# Patient Record
Sex: FEMALE | Race: WHITE | Employment: FULL TIME | URBAN - METROPOLITAN AREA
[De-identification: names, ages, dates, MRNs, and addresses within clinical notes are randomized per-mention and may not be internally consistent; named-entity substitution may affect disease eponyms.]

---

## 2017-03-16 ENCOUNTER — OFFICE VISIT (OUTPATIENT)
Dept: FAMILY MEDICINE CLINIC | Age: 42
End: 2017-03-16

## 2017-03-16 ENCOUNTER — TELEPHONE (OUTPATIENT)
Dept: FAMILY MEDICINE CLINIC | Age: 42
End: 2017-03-16

## 2017-03-16 VITALS
HEIGHT: 72 IN | RESPIRATION RATE: 13 BRPM | HEART RATE: 52 BPM | SYSTOLIC BLOOD PRESSURE: 118 MMHG | BODY MASS INDEX: 28.17 KG/M2 | WEIGHT: 208 LBS | OXYGEN SATURATION: 99 % | DIASTOLIC BLOOD PRESSURE: 70 MMHG

## 2017-03-16 DIAGNOSIS — Z13.9 SCREENING: ICD-10-CM

## 2017-03-16 PROCEDURE — 99212 OFFICE O/P EST SF 10 MIN: CPT | Performed by: CLINICAL NURSE SPECIALIST

## 2017-03-16 ASSESSMENT — ENCOUNTER SYMPTOMS
NAUSEA: 0
SWOLLEN GLANDS: 0
COUGH: 0
VOMITING: 0

## 2017-03-19 ASSESSMENT — ENCOUNTER SYMPTOMS
SHORTNESS OF BREATH: 0
SORE THROAT: 0
CHEST TIGHTNESS: 0
DIARRHEA: 0
CONSTIPATION: 0
ABDOMINAL PAIN: 0

## 2017-03-24 ENCOUNTER — HOSPITAL ENCOUNTER (OUTPATIENT)
Dept: MAMMOGRAPHY | Age: 42
Discharge: HOME | End: 2017-03-24

## 2017-03-29 DIAGNOSIS — R92.2 BREAST DENSITY: Primary | ICD-10-CM

## 2017-04-04 DIAGNOSIS — R92.8 ABNORMAL MAMMOGRAM: Primary | ICD-10-CM

## 2017-04-06 ENCOUNTER — HOSPITAL ENCOUNTER (OUTPATIENT)
Dept: ULTRASOUND IMAGING | Age: 42
Discharge: HOME | End: 2017-04-06
Attending: FAMILY MEDICINE | Admitting: FAMILY MEDICINE

## 2017-04-06 DIAGNOSIS — R92.2 BREAST DENSITY: ICD-10-CM

## 2017-04-06 DIAGNOSIS — R92.8 OTHER ABNORMAL AND INCONCLUSIVE FINDINGS ON DIAGNOSTIC IMAGING OF BREAST: ICD-10-CM

## 2017-04-06 DIAGNOSIS — R92.8 ABNORMAL MAMMOGRAM, UNSPECIFIED: ICD-10-CM

## 2017-07-21 ENCOUNTER — OFFICE VISIT (OUTPATIENT)
Dept: FAMILY MEDICINE CLINIC | Age: 42
End: 2017-07-21

## 2017-07-21 VITALS
OXYGEN SATURATION: 99 % | HEART RATE: 50 BPM | SYSTOLIC BLOOD PRESSURE: 114 MMHG | WEIGHT: 209.6 LBS | BODY MASS INDEX: 28.43 KG/M2 | DIASTOLIC BLOOD PRESSURE: 78 MMHG | TEMPERATURE: 98.3 F

## 2017-07-21 DIAGNOSIS — M25.561 CHRONIC PAIN OF RIGHT KNEE: Primary | ICD-10-CM

## 2017-07-21 DIAGNOSIS — E66.09 OBESITY DUE TO EXCESS CALORIES, UNSPECIFIED OBESITY SEVERITY: ICD-10-CM

## 2017-07-21 DIAGNOSIS — G89.29 CHRONIC PAIN OF RIGHT KNEE: Primary | ICD-10-CM

## 2017-07-21 PROCEDURE — 99213 OFFICE O/P EST LOW 20 MIN: CPT | Performed by: FAMILY MEDICINE

## 2017-07-21 RX ORDER — SOTALOL HYDROCHLORIDE 80 MG/1
TABLET ORAL
Refills: 5 | COMMUNITY
Start: 2017-07-13 | End: 2018-07-18 | Stop reason: ALTCHOICE

## 2017-07-23 ASSESSMENT — ENCOUNTER SYMPTOMS
CHEST TIGHTNESS: 0
COUGH: 0
ABDOMINAL PAIN: 0
SHORTNESS OF BREATH: 0

## 2017-12-15 ENCOUNTER — OFFICE VISIT (OUTPATIENT)
Dept: FAMILY MEDICINE CLINIC | Age: 42
End: 2017-12-15

## 2017-12-15 VITALS
BODY MASS INDEX: 27.48 KG/M2 | OXYGEN SATURATION: 98 % | TEMPERATURE: 98 F | WEIGHT: 202.6 LBS | DIASTOLIC BLOOD PRESSURE: 74 MMHG | HEART RATE: 61 BPM | SYSTOLIC BLOOD PRESSURE: 120 MMHG

## 2017-12-15 DIAGNOSIS — Z12.4 PAP SMEAR FOR CERVICAL CANCER SCREENING: ICD-10-CM

## 2017-12-15 DIAGNOSIS — Z01.419 ENCOUNTER FOR GYNECOLOGICAL EXAMINATION WITHOUT ABNORMAL FINDING: Primary | ICD-10-CM

## 2017-12-15 PROBLEM — I49.3 PVC (PREMATURE VENTRICULAR CONTRACTION): Status: ACTIVE | Noted: 2017-02-10

## 2017-12-15 PROBLEM — I47.20 VENTRICULAR TACHYCARDIA (HCC): Status: ACTIVE | Noted: 2017-03-29

## 2017-12-15 LAB
BILIRUBIN, POC: NORMAL
BLOOD URINE, POC: NORMAL
CLARITY, POC: CLEAR
COLOR, POC: YELLOW
GLUCOSE URINE, POC: NORMAL
KETONES, POC: NORMAL
LEUKOCYTE EST, POC: NORMAL
NITRITE, POC: NORMAL
PH, POC: 6.5
PROTEIN, POC: NORMAL
SPECIFIC GRAVITY, POC: 1.01
UROBILINOGEN, POC: 1

## 2017-12-15 PROCEDURE — 81002 URINALYSIS NONAUTO W/O SCOPE: CPT | Performed by: FAMILY MEDICINE

## 2017-12-15 PROCEDURE — 99396 PREV VISIT EST AGE 40-64: CPT | Performed by: FAMILY MEDICINE

## 2017-12-15 RX ORDER — AZITHROMYCIN 250 MG/1
TABLET, FILM COATED ORAL
COMMUNITY
Start: 2017-12-13 | End: 2019-04-10 | Stop reason: ALTCHOICE

## 2017-12-15 RX ORDER — NEOMYCIN SULFATE, POLYMYXIN B SULFATE AND DEXAMETHASONE 3.5; 10000; 1 MG/ML; [USP'U]/ML; MG/ML
SUSPENSION/ DROPS OPHTHALMIC
Refills: 0 | COMMUNITY
Start: 2017-11-18 | End: 2018-04-12

## 2017-12-15 ASSESSMENT — PATIENT HEALTH QUESTIONNAIRE - PHQ9
1. LITTLE INTEREST OR PLEASURE IN DOING THINGS: 0
SUM OF ALL RESPONSES TO PHQ9 QUESTIONS 1 & 2: 0
2. FEELING DOWN, DEPRESSED OR HOPELESS: 0
SUM OF ALL RESPONSES TO PHQ QUESTIONS 1-9: 0

## 2017-12-15 NOTE — PROGRESS NOTES
Subjective:       Pepe Irby is a 38 y.o. female here for routine exam.  Current Complaints: no.  Personal Health Questionnaire Reviewed: no.     Gynecologic History  Patient's last menstrual period was 2017. Contraception: vasectomynormal  Last Pap: no Results: normal  Last Mammogram: 2017 Results:     Patient's medications, allergies, past medical, surgical, social and family histories were reviewed and updated as appropriate. Review of Systems  Pertinent items are noted in HPI.       Objective:      /74 (Site: Left Arm, Position: Sitting, Cuff Size: Large Adult)   Pulse 61   Temp 98 °F (36.7 °C) (Oral)   Wt 202 lb 9.6 oz (91.9 kg)   LMP 2017   SpO2 98%   BMI 27.48 kg/m²   Neck: no adenopathy, no carotid bruit, no JVD, supple, symmetrical, trachea midline and thyroid not enlarged, symmetric, no tenderness/mass/nodules  Lungs: clear to auscultation bilaterally  Breasts: normal appearance, no masses or tenderness  Heart: regular rate and rhythm  Abdomen: soft, non-tender; bowel sounds normal; no masses,  no organomegaly  Pelvic: cervix normal in appearance, external genitalia normal, no adnexal masses or tenderness, no cervical motion tenderness, rectovaginal septum normal, uterus normal size, shape, and consistency, vagina normal without discharge  Lymph nodes: Cervical, supraclavicular, and axillary nodes normal.  Neurologic: Grossly normal        Stool-heme neg  Assessment:      Healthy female exam.      Plan:    Education Reviewed and Recommended: Smoking Cessation, Self Breast Exams, Weight Bearing Exercise, Low Fat, Low Cholesterol Diet, Depression Evaluation  Follow up: 1 year

## 2017-12-19 LAB
HPV COMMENT: ABNORMAL
HPV TYPE 16: NOT DETECTED
HPV TYPE 18: NOT DETECTED
HPVOH (OTHER TYPES): DETECTED

## 2018-03-26 ENCOUNTER — HOSPITAL ENCOUNTER (OUTPATIENT)
Dept: MAMMOGRAPHY | Age: 43
Discharge: HOME | End: 2018-03-26
Attending: FAMILY MEDICINE | Admitting: FAMILY MEDICINE

## 2018-03-26 DIAGNOSIS — Z12.31 ENCOUNTER FOR SCREENING MAMMOGRAM FOR BREAST CANCER: ICD-10-CM

## 2018-03-27 ENCOUNTER — TELEPHONE (OUTPATIENT)
Dept: FAMILY MEDICINE CLINIC | Age: 43
End: 2018-03-27

## 2018-03-27 NOTE — TELEPHONE ENCOUNTER
I 'm not sure why she was here for labs or what labs she needs?  I did not ask her to come in for labs

## 2018-04-12 ENCOUNTER — OFFICE VISIT (OUTPATIENT)
Dept: CARDIOLOGY CLINIC | Age: 43
End: 2018-04-12

## 2018-04-12 VITALS
BODY MASS INDEX: 28.31 KG/M2 | DIASTOLIC BLOOD PRESSURE: 76 MMHG | SYSTOLIC BLOOD PRESSURE: 120 MMHG | HEIGHT: 72 IN | HEART RATE: 46 BPM | WEIGHT: 209 LBS | OXYGEN SATURATION: 96 %

## 2018-04-12 DIAGNOSIS — I42.0 DILATED CARDIOMYOPATHY (HCC): ICD-10-CM

## 2018-04-12 DIAGNOSIS — R94.30 EJECTION FRACTION < 50%: ICD-10-CM

## 2018-04-12 DIAGNOSIS — I47.20 VENTRICULAR TACHYCARDIA: ICD-10-CM

## 2018-04-12 DIAGNOSIS — I49.3 PVC (PREMATURE VENTRICULAR CONTRACTION): Primary | ICD-10-CM

## 2018-04-12 PROCEDURE — 93000 ELECTROCARDIOGRAM COMPLETE: CPT | Performed by: INTERNAL MEDICINE

## 2018-04-12 PROCEDURE — 99204 OFFICE O/P NEW MOD 45 MIN: CPT | Performed by: INTERNAL MEDICINE

## 2018-04-12 RX ORDER — CARVEDILOL 3.12 MG/1
3.12 TABLET ORAL 2 TIMES DAILY WITH MEALS
Qty: 180 TABLET | Refills: 3 | Status: SHIPPED | OUTPATIENT
Start: 2018-04-12 | End: 2019-04-09 | Stop reason: SDUPTHER

## 2018-04-17 ENCOUNTER — NURSE ONLY (OUTPATIENT)
Dept: CARDIOLOGY CLINIC | Age: 43
End: 2018-04-17

## 2018-04-17 DIAGNOSIS — I49.9 IRREGULAR HEART RATE: Primary | ICD-10-CM

## 2018-04-25 PROCEDURE — 93224 XTRNL ECG REC UP TO 48 HRS: CPT | Performed by: INTERNAL MEDICINE

## 2018-04-27 ENCOUNTER — TELEPHONE (OUTPATIENT)
Dept: CARDIOLOGY CLINIC | Age: 43
End: 2018-04-27

## 2018-04-27 DIAGNOSIS — I47.20 VENTRICULAR TACHYCARDIA: ICD-10-CM

## 2018-07-18 ENCOUNTER — HOSPITAL ENCOUNTER (OUTPATIENT)
Dept: NON INVASIVE DIAGNOSTICS | Age: 43
Discharge: HOME | End: 2018-07-18
Attending: INTERNAL MEDICINE | Admitting: INTERNAL MEDICINE

## 2018-07-18 ENCOUNTER — OFFICE VISIT (OUTPATIENT)
Dept: CARDIOLOGY CLINIC | Age: 43
End: 2018-07-18

## 2018-07-18 VITALS
HEIGHT: 72 IN | OXYGEN SATURATION: 98 % | WEIGHT: 206 LBS | HEART RATE: 69 BPM | BODY MASS INDEX: 27.9 KG/M2 | DIASTOLIC BLOOD PRESSURE: 68 MMHG | SYSTOLIC BLOOD PRESSURE: 132 MMHG

## 2018-07-18 DIAGNOSIS — I42.0 DILATED CARDIOMYOPATHY (HCC): ICD-10-CM

## 2018-07-18 DIAGNOSIS — Z92.89 HISTORY OF CARDIAC MONITORING: ICD-10-CM

## 2018-07-18 DIAGNOSIS — I49.3 PVC (PREMATURE VENTRICULAR CONTRACTION): Primary | ICD-10-CM

## 2018-07-18 DIAGNOSIS — R09.89 OTHER SPECIFIED SYMPTOMS AND SIGNS INVOLVING THE CIRCULATORY AND RESPIRATORY SYSTEMS: ICD-10-CM

## 2018-07-18 LAB
LEFT VENTRICULAR EJECTION FRACTION HIGH VALUE: 55 %
LEFT VENTRICULAR EJECTION FRACTION MODE: NORMAL
LV EF: 55 %
LVEF MODALITY: NORMAL

## 2018-07-18 PROCEDURE — 93000 ELECTROCARDIOGRAM COMPLETE: CPT | Performed by: INTERNAL MEDICINE

## 2018-07-18 PROCEDURE — 99213 OFFICE O/P EST LOW 20 MIN: CPT | Performed by: INTERNAL MEDICINE

## 2018-07-18 RX ORDER — ENALAPRIL MALEATE 2.5 MG/1
TABLET ORAL
Refills: 5 | COMMUNITY
Start: 2018-06-27 | End: 2019-04-10 | Stop reason: SDUPTHER

## 2018-07-18 NOTE — PATIENT INSTRUCTIONS
converting enzyme. Lisinopril is used to treat high blood pressure (hypertension) in adults and children who are at least 10years old. Lisinopril is also used to treat congestive heart failure in adults, or to improve survival after a heart attack. Lisinopril may also be used for purposes not listed in this medication guide. What should I discuss with my healthcare provider before taking lisinopril? You should not use lisinopril if you are allergic to it, or if:  · you have a history of angioedema;  · you recently took a heart medicine called sacubatril; or  · if you are allergic to any other ACE inhibitor, such as benazepril, captopril, enalapril, fosinopril, moexipril, perindopril, quinapril, ramipril, or trandolapril. Do not take lisinopril within 36 hours before or after taking medicine that contains sacubatril (such as Entresto). If you have diabetes, do not use lisinopril together with any medication that contains aliskiren (such as Tekturna or Tekamlo). You may also need to avoid taking lisinopril with aliskiren if you have kidney disease. You should not use lisinopril if you have hereditary angioedema. To make sure lisinopril is safe for you, tell your doctor if you have:  · kidney disease (or if you are on dialysis);  · liver disease; or  · high levels of potassium in your blood. Do not use if you are pregnant. If you become pregnant, stop taking this medicine and tell your doctor right away. Lisinopril can cause injury or death to the unborn baby if you take the medicine during your second or third trimester. It is not known whether lisinopril passes into breast milk or if it could harm a nursing baby. You should not breast-feed while using this medicine. How should I take lisinopril? Follow all directions on your prescription label. Your doctor may occasionally change your dose. Do not take this medicine in larger or smaller amounts or for longer than recommended.   Drink plenty of water each day while you are taking this medicine. Lisinopril can be taken with or without food. Measure liquid medicine with the dosing syringe provided, or with a special dose-measuring spoon or medicine cup. If you do not have a dose-measuring device, ask your pharmacist for one. Your blood pressure will need to be checked often, and you may need frequent blood tests. Call your doctor if you have ongoing vomiting or diarrhea, or if you are sweating more than usual. You can easily become dehydrated while taking this medicine. This can lead to very low blood pressure, electrolyte disorders, or kidney failure while you are taking lisinopril. If you need surgery, tell the surgeon ahead of time that you are using lisinopril. If you are being treated for high blood pressure, keep using this medicine even if you feel well. High blood pressure often has no symptoms. You may need to use blood pressure medicine for the rest of your life. Store at room temperature away from moisture and heat. Do not freeze the oral liquid. What happens if I miss a dose? Take the missed dose as soon as you remember. Skip the missed dose if it is almost time for your next scheduled dose. Do not take extra medicine to make up the missed dose. What happens if I overdose? Seek emergency medical attention or call the Poison Help line at 1-406.506.8285. What should I avoid while taking lisinopril? Drinking alcohol can further lower your blood pressure and may increase certain side effects of lisinopril. Avoid becoming overheated or dehydrated during exercise, in hot weather, or by not drinking enough fluids. Lisinopril can decrease sweating and you may be more prone to heat stroke. Do not use salt substitutes or potassium supplements while taking lisinopril, unless your doctor has told you to. Avoid getting up too fast from a sitting or lying position, or you may feel dizzy. Get up slowly and steady yourself to prevent a fall.   What are the never share your medicines with others, and use this medication only for the indication prescribed. Every effort has been made to ensure that the information provided by [address] is accurate, up-to-date, and complete, but no guarantee is made to that effect. Drug information contained herein may be time sensitive. [facility] information has been compiled for use by healthcare practitioners and consumers in the [place] and therefore [facility] does not warrant that uses outside of the [place] are appropriate, unless specifically indicated otherwise. [facility]'s drug information does not endorse drugs, diagnose patients or recommend therapy. [facility]'s drug information is an informational resource designed to assist licensed healthcare practitioners in caring for their patients and/or to serve consumers viewing this service as a supplement to, and not a substitute for, the expertise, skill, knowledge and judgment of healthcare practitioners. The absence of a warning for a given drug or drug combination in no way should be construed to indicate that the drug or drug combination is safe, effective or appropriate for any given patient. [facility] does not assume any responsibility for any aspect of healthcare administered with the aid of information [facility] provides. The information contained herein is not intended to cover all possible uses, directions, precautions, warnings, drug interactions, allergic reactions, or adverse effects. If you have questions about the drugs you are taking, check with your doctor, nurse or pharmacist.  Copyright 8463-2421 [address]. Version: 14.02. Revision date: 10/12/2017. Care instructions adapted under license by [facility] ([facility]). If you have questions about a medical condition or this instruction, always ask your healthcare professional. [place] [address] any warranty or liability for your use of this information.

## 2019-03-28 ENCOUNTER — HOSPITAL ENCOUNTER (OUTPATIENT)
Dept: MAMMOGRAPHY | Age: 44
Discharge: HOME | End: 2019-03-28
Payer: COMMERCIAL

## 2019-03-28 DIAGNOSIS — Z12.39 BREAST CANCER SCREENING, HIGH RISK PATIENT: ICD-10-CM

## 2019-03-28 PROCEDURE — 77067 SCR MAMMO BI INCL CAD: CPT

## 2019-04-09 RX ORDER — CARVEDILOL 3.12 MG/1
TABLET ORAL
Qty: 180 TABLET | Refills: 3 | Status: SHIPPED | OUTPATIENT
Start: 2019-04-09 | End: 2020-05-04

## 2019-04-10 ENCOUNTER — OFFICE VISIT (OUTPATIENT)
Dept: CARDIOLOGY CLINIC | Age: 44
End: 2019-04-10
Payer: COMMERCIAL

## 2019-04-10 ENCOUNTER — HOSPITAL ENCOUNTER (OUTPATIENT)
Dept: NON INVASIVE DIAGNOSTICS | Age: 44
Discharge: HOME | End: 2019-04-10
Payer: COMMERCIAL

## 2019-04-10 VITALS
SYSTOLIC BLOOD PRESSURE: 124 MMHG | BODY MASS INDEX: 28.17 KG/M2 | WEIGHT: 208 LBS | HEIGHT: 72 IN | DIASTOLIC BLOOD PRESSURE: 70 MMHG

## 2019-04-10 DIAGNOSIS — I49.3 PVC (PREMATURE VENTRICULAR CONTRACTION): ICD-10-CM

## 2019-04-10 DIAGNOSIS — I42.0 DILATED CARDIOMYOPATHY (HCC): ICD-10-CM

## 2019-04-10 DIAGNOSIS — I47.20 VENTRICULAR TACHYCARDIA: ICD-10-CM

## 2019-04-10 DIAGNOSIS — I49.3 PVC (PREMATURE VENTRICULAR CONTRACTION): Primary | ICD-10-CM

## 2019-04-10 LAB
LEFT VENTRICULAR EJECTION FRACTION HIGH VALUE: 60 %
LEFT VENTRICULAR EJECTION FRACTION MODE: NORMAL
LV EF: 55 %
LV EF: 58 %
LVEF MODALITY: NORMAL

## 2019-04-10 PROCEDURE — 99213 OFFICE O/P EST LOW 20 MIN: CPT | Performed by: INTERNAL MEDICINE

## 2019-04-10 PROCEDURE — 93000 ELECTROCARDIOGRAM COMPLETE: CPT | Performed by: INTERNAL MEDICINE

## 2019-04-10 PROCEDURE — 93306 TTE W/DOPPLER COMPLETE: CPT

## 2019-04-10 RX ORDER — ENALAPRIL MALEATE 2.5 MG/1
TABLET ORAL
Qty: 90 TABLET | Refills: 3 | Status: SHIPPED | OUTPATIENT
Start: 2019-04-10 | End: 2020-04-07

## 2019-04-10 NOTE — PROGRESS NOTES
[facility]   Electrophysiology Follow up   Date: 4/10/2019      Chief Complaint   Patient presents with    Palpitations     7 month f/u. Echo today. No cardiac complaints.  Cardiomyopathy        HPI: Luis A Garcia is a 41 y.o. female sent as referral from Dr. Kiera Eldridge for PVCs. No pertinent past medical history other than PVCs. Symptomatic with palpitations, on sotalol, previous PVC/VT  ablation 3/30/2017 and Vtach new onset day before ablation. holter 2018 completed with echo indicated an increase in PVCs when compared to previous holter 2016. EF was noted to be 45% 2/2018. The last Holter 2016 showed PVC burden of 26K in 48 hours. Repeat holter in 4/2018 with burden at 20.1%. Patient denies lightheadedness, SOB, dizziness, chest pain,  orthopnea, edema, presyncope or syncope. Repeat Echo 7/18/2018 with EF 55%. Echo repeat today Ef looks unchanged  Kervin Asher is feeling well from a cardiac standpoint. Patient denies lightheadedness, dizziness, chest pain, palpitations, orthopnea, edema, presyncope or syncope. Past Medical History:   Diagnosis Date    Spontaneous vaginal delivery     [id number]    PVC    Past Surgical History:   Procedure Laterality Date    FRACTURE SURGERY      leg    KNEE SURGERY         Allergies: Allergies   Allergen Reactions    Morphine Anxiety       Social History:   reports that she has never smoked. She has never used smokeless tobacco. She reports that she does not drink alcohol or use drugs. Family History:  family history includes Allergies in her son; Asthma in her brother; Cancer in her father. Reviewed. Denies family history of sudden cardiac death, arrhythmia, premature CAD    Review of System:  All other systems reviewed and are negative except for that noted above.  Pertinent negatives are:   General: negative for fever, chills   Ophthalmic ROS: negative for - eye pain or loss of vision  ENT ROS: negative for - headaches, sore throat Respiratory: negative for - cough, sputum  Cardiovascular: Reviewed in HPI  Gastrointestinal: negative for - abdominal pain, diarrhea, N/V  Hematology: negative for - bleeding, blood clots, bruising or jaundice  Genito-Urinary:  negative for - Dysuria or incontinence  Musculoskeletal: negative for - Joint swelling, muscle pain  Neurological: negative for - confusion, dizziness, headaches   Psychiatric: No anxiety, no depression. Dermatological: negative for - rash    Physical Examination:  Vitals:    04/10/19 1451   BP: 124/70      Wt Readings from Last 3 Encounters:   04/10/19 208 lb (94.3 kg)   07/18/18 206 lb (93.4 kg)   04/12/18 209 lb (94.8 kg)       · Constitutional: Oriented. No distress. · Head: Normocephalic and atraumatic. · Mouth/Throat: Oropharynx is clear and moist.   · Eyes: Conjunctivae normal. EOM are normal.   · Neck: Neck supple. No rigidity. No JVD present. · Cardiovascular: Normal rate, regular rhythm, S1&S2. · Pulmonary/Chest: Bilateral respiratory sounds. No wheezes, No rhonchi. · Abdominal: Soft. Bowel sounds present. No distension, No tenderness. · Musculoskeletal: No tenderness. No edema    · Lymphadenopathy: Has no cervical adenopathy. · Neurological: Alert and oriented. Cranial nerve appears intact, No Gross deficit   · Skin: Skin is warm and dry. No rash noted. · Psychiatric: Has a normal behavior       Labs, diagnostic and imaging results reviewed. Reviewed. Lab Results   Component Value Date    TSH 1.20 06/27/2016    CREATININE 0.6 06/27/2016    AST 15 06/27/2016    ALT 12 06/27/2016     ECG 4/10/2019:   Sinus with PVC's       Echo 4/10/19  My review shows normal EF    Holter 48 hour 4/17/2018  SR. HR  (70). Frequent PVCs with burden at 20.1%. No symptoms reported    Echo 7/18/2018:  Normal left ventricle size and systolic function with an EF of 55%. Mild tricuspid regurgitation with RVSP of 35 mmHg.   Trivial mitral regurgitation    Echocardiogram and   occassional trigeminy. EPS 3/2017  Subsequently, we advanced a quadripolar catheter into the right ventricular Septum. The patient had frequent PVCs. The PVC were narrow and had LBBB morphology. Transition in V3. Lead II, III and avF were positive, lead 1 and avL were negative, indicating origin from outflow region. Cathter D-F Smart touch surround flow mapping showed earliest activation in the posterior RVOT. It was 20ms before onset of PVC. Pace mapping from this area showed 99% pace match. However, very early point couldn't be identified. CARTO 3 D mapping of the RV septum and RVOT was performed. Arterial access 8Fr was obtained  The catheter was advanced into the left ventricle outflow tract after crossing the aortic valve. Subsequently, we used the CARTO-3system and created a three-dimensional electroanatomical map of the left ventricle and left side of interventricular septum in region of outflow tract was performed We mapped the earliest electrogram, however, none of the areas were before RVOT by activation. At this point, we decided to ablate in posterior RVOT. 82-34 Watt lesion application induced triggering of VT with exact PVC morphology. Multiple lesions were performed with suppression of arrhythmia but after wait PVC with slightly different morphology appeared. This PVC was mapped again but the earliest point wasn't in the same area, we remapped both left and right out flow tract. However, no earliest point couldn't be identified. It is possible that the focus is intra-myocardial in posterior RVOT septum. Additional lesions in this RVOT didn't suppress PVC    Next, under fluoroscopy, we advanced a decapolar catheter into the coronary sinus, so the distal poles were in coronary sinus for left atrial recording, mapping and pacing. We tried to go to anterior interventricular vein but due to venous anatomy, it couldn't be advanced.       Medication:  Current Outpatient Medications Medication Sig Dispense Refill    carvedilol (COREG) 3.125 MG tablet TAKE 1 TABLET BY MOUTH TWICE DAILY WITH MEALS 180 tablet 3    enalapril (VASOTEC) 2.5 MG tablet TK 1 T PO DAILY  5     No current facility-administered medications for this visit. Assessment:  PVCs   s/p ablation 3/2017 - Holter showed PVC burden 14%, increase from previous holter despite being on sotalol and recently started on vasotec 2.5mg (3/20/2018). Sotalol was stopped and changed to coreg      Seems like, sotalol was not effective, discontinued 4/12/2018, holter repeated 4/17/2018 with burden at 20.1% No PVC on ECG today. EF has improved to 55%    -Discussed ablation in future if EF drops    Risks, benefits and alternative of each treatment options were explained. All questions answered. Continue with coreg 3.125mg BID and see if burden is changing and if EF improves with echo in 6 months. Plan is to repeat echo and consider ablation if EF starts to deteriorate    Yearly echo    Holter Monitor Every few years    cardiomyopathy:  EF on echo 7/2018 at 55%   EF normal this time  On lisiniopril        Plan:  Echo in one year with f/u        I independently reviewed  holter   Thank you for allowing me to participate in the care of Niels Ledesma. Further evaluation will be based upon the patient's clinical course and testing results. All questions and concerns were addressed to the patient/family. Alternatives to my treatment were discussed. I have discussed the above stated plan and the patient verbalized understanding and agreed with the plan. NOTE: This report was transcribed using voice recognition software. Every effort was made to ensure accuracy, however, inadvertent computerized transcription errors may be present. Nicole Reed MD, Micah Shaper [address] [facility]   Office: [phone]     Scribe attestation:  This note was scribed in the presence of Nicole Reed MD by Alayna Patel RN    I,

## 2020-01-14 ENCOUNTER — TELEPHONE (OUTPATIENT)
Dept: CARDIOLOGY CLINIC | Age: 45
End: 2020-01-14

## 2020-01-14 NOTE — TELEPHONE ENCOUNTER
Has appt with echo with mxa in April .  The order for the echo will  before she has the test so we need a new order put in epic

## 2020-05-04 RX ORDER — CARVEDILOL 3.12 MG/1
TABLET ORAL
Qty: 180 TABLET | Refills: 1 | Status: SHIPPED | OUTPATIENT
Start: 2020-05-04 | End: 2020-11-20

## 2020-07-02 ENCOUNTER — HOSPITAL ENCOUNTER (OUTPATIENT)
Dept: MAMMOGRAPHY | Age: 45
Discharge: HOME | End: 2020-07-02
Payer: COMMERCIAL

## 2020-07-02 PROCEDURE — 77067 SCR MAMMO BI INCL CAD: CPT

## 2020-07-06 RX ORDER — ENALAPRIL MALEATE 2.5 MG/1
TABLET ORAL
Qty: 90 TABLET | Refills: 0 | Status: SHIPPED | OUTPATIENT
Start: 2020-07-06 | End: 2020-09-30

## 2020-07-06 NOTE — TELEPHONE ENCOUNTER
Requested Prescriptions     Pending Prescriptions Disp Refills    enalapril (VASOTEC) 2.5 MG tablet [Pharmacy Med Name: ENALAPRIL 2.5MG TABLETS] 90 tablet 0     Sig: TAKE 1 TABLET BY MOUTH DAILY          Number: 90    Refills: 3    Last Office Visit: 4/10/2019     Next Office Visit: 7/15/2020

## 2020-07-08 ENCOUNTER — HOSPITAL ENCOUNTER (OUTPATIENT)
Dept: WOMENS IMAGING | Age: 45
Discharge: HOME | End: 2020-07-08
Payer: COMMERCIAL

## 2020-07-08 ENCOUNTER — HOSPITAL ENCOUNTER (OUTPATIENT)
Dept: ULTRASOUND IMAGING | Age: 45
Discharge: HOME | End: 2020-07-08
Payer: COMMERCIAL

## 2020-07-08 PROCEDURE — 76642 ULTRASOUND BREAST LIMITED: CPT

## 2020-07-08 PROCEDURE — G0279 TOMOSYNTHESIS, MAMMO: HCPCS

## 2020-07-13 ENCOUNTER — HOSPITAL ENCOUNTER (OUTPATIENT)
Dept: NON INVASIVE DIAGNOSTICS | Age: 45
Discharge: HOME | End: 2020-07-13
Payer: COMMERCIAL

## 2020-07-13 LAB
LV EF: 55 %
LVEF MODALITY: NORMAL

## 2020-07-13 PROCEDURE — 93306 TTE W/DOPPLER COMPLETE: CPT

## 2020-07-15 ENCOUNTER — VIRTUAL VISIT (OUTPATIENT)
Dept: CARDIOLOGY CLINIC | Age: 45
End: 2020-07-15
Payer: COMMERCIAL

## 2020-07-15 PROCEDURE — 99213 OFFICE O/P EST LOW 20 MIN: CPT | Performed by: INTERNAL MEDICINE

## 2020-07-15 NOTE — PROGRESS NOTES
[address]   Electrophysiology  Virtual Visit  Date: 7/15/2020      Chief Complaint   Patient presents with    Follow-up     PVC    Tachycardia        HPI: Ann Gayle is a 45 y.o. female with a PMH of PVCs. No pertinent past medical history other than PVCs. Symptomatic with palpitations, on sotalol, previous PVC/VT  ablation 3/30/2017 and Vtach new onset day before ablation. holter 2018 completed with echo indicated an increase in PVCs when compared to previous holter 2016. EF was noted to be 45% 2/2018. The last Holter 2016 showed PVC burden of 26K in 48 hours. Repeat holter in 4/2018 with burden at 20.1%. Repeat Echo 7/18/2018 with EF 55%. Echo 7/13/2020 with EF 55%    Johnny Torres presents to the office via virtual visit. She is doing well from a cardiac standpoint. Patient denies lightheadedness, dizziness, chest pain, palpitations, orthopnea, edema, presyncope or syncope. Denies any PVC recurrence        Past Medical History:   Diagnosis Date    Spontaneous vaginal delivery     [id number]    PVC    Past Surgical History:   Procedure Laterality Date    FRACTURE SURGERY      leg    KNEE SURGERY         Allergies: Allergies   Allergen Reactions    Morphine Anxiety       Social History:   reports that she has never smoked. She has never used smokeless tobacco. She reports that she does not drink alcohol or use drugs. Family History:  family history includes Allergies in her son; Asthma in her brother; Cancer in her father. Reviewed. Denies family history of sudden cardiac death, arrhythmia, premature CAD    Review of System:  All other systems reviewed and are negative except for that noted above.  Pertinent negatives are:   General: negative for fever, chills   Ophthalmic ROS: negative for - eye pain or loss of vision  ENT ROS: negative for - headaches, sore throat   Respiratory: negative for - cough, sputum  Cardiovascular: Reviewed in HPI  Gastrointestinal: negative for - diffuse    hypokinesis. Left ventricular diastolic function parameters were    normal. Ejection fraction (MOD, 2-plane): 45%. - Left atrium: The atrium was mildly dilated. - Right ventricle: Systolic function was normal by objective    interpretation. Impressions:  In comparison to the previous report the LV function  is slightly reduced. Echo 3/2017  - Left ventricle: The cavity size was normal. Wall thickness was    normal. Systolic function was normal. The estimated ejection    fraction was in the range of 50% to 55%. Wall motion was normal;    there were no regional wall motion abnormalities. Left ventricular    diastolic function parameters were normal.  - Right ventricle: The cavity size was normal. Wall thickness was    normal. Systolic function was normal by objective interpretation.    TAPSE: 2.6cm. Tricuspid annular systolic velocity: 64RE/U. - Atrial septum: No defect or patent foramen ovale was identified by    color Doppler examination. Holter Monitor 2/27/18: Indications:  PVCs  Date and Time:   From: 02/22/2018  At: 8:48   To: 02/24/2018  At: 8:48    Total Hours:  48 hr, 0 min  Quality of Tracing:  good   Basic Rhythm:  sinus  Minimum Heart Rate:  41  Maximum Heart Rate:  110 Average Heart   Rate:  66  PVC's:  46791 Average PVC's/Hour:  557/hour  ARRHYTHMIAS:  Ventricular Tachycardia:  none  SVT:  none  ST and T Wave Changes:    PAC's:  1  Conduction Defects:    Symptoms:  Patient reported no symptoms during monitoring period. CONCLUSION: Normal sinus rhythm with frequent bigeminy and   occassional trigeminy. EPS 3/2017  Subsequently, we advanced a quadripolar catheter into the right ventricular Septum. The patient had frequent PVCs. The PVC were narrow and had LBBB morphology. Transition in V3. Lead II, III and avF were positive, lead 1 and avL were negative, indicating origin from outflow region.  Cathter D-F Smart touch surround flow mapping showed earliest activation in the posterior RVOT. It was 20ms before onset of PVC. Pace mapping from this area showed 99% pace match. However, very early point couldn't be identified. CARTO 3 D mapping of the RV septum and RVOT was performed. Arterial access 8Fr was obtained  The catheter was advanced into the left ventricle outflow tract after crossing the aortic valve. Subsequently, we used the CARTO-3system and created a three-dimensional electroanatomical map of the left ventricle and left side of interventricular septum in region of outflow tract was performed We mapped the earliest electrogram, however, none of the areas were before RVOT by activation. At this point, we decided to ablate in posterior RVOT. 82-98 Watt lesion application induced triggering of VT with exact PVC morphology. Multiple lesions were performed with suppression of arrhythmia but after wait PVC with slightly different morphology appeared. This PVC was mapped again but the earliest point wasn't in the same area, we remapped both left and right out flow tract. However, no earliest point couldn't be identified. It is possible that the focus is intra-myocardial in posterior RVOT septum. Additional lesions in this RVOT didn't suppress PVC    Next, under fluoroscopy, we advanced a decapolar catheter into the coronary sinus, so the distal poles were in coronary sinus for left atrial recording, mapping and pacing. We tried to go to anterior interventricular vein but due to venous anatomy, it couldn't be advanced. Medication:  Current Outpatient Medications   Medication Sig Dispense Refill    enalapril (VASOTEC) 2.5 MG tablet TAKE 1 TABLET BY MOUTH DAILY 90 tablet 0    carvedilol (COREG) 3.125 MG tablet TAKE 1 TABLET BY MOUTH TWICE DAILY WITH MEALS 180 tablet 1     No current facility-administered medications for this visit.         Assessment:  PVCs   -s/p ablation 3/2017 - Holter showed PVC burden 14%, increase from previous holter despite being on sotalol and recently started on vasotec 2.5mg (3/20/2018). Sotalol was stopped and changed to coreg  -Seems like, sotalol was not effective, discontinued 4/12/2018, holter repeated 4/17/2018 with burden at 20.1% No PVC on ECG today. EF has improved to 55%  -Discussed ablation in future if EF drops   -Risks, benefits and alternative of each treatment options were explained. All questions answered.    -Continue with coreg 3.125mg BID and see if burden is changing and if EF improves with echo in 6 months.   -Echo 7/13/2020 EF 55%    She feels fine    Cardiomyopathy:  -EF on echo 7/2020 55%  -EF on echo 7/2018 55%   -On lisiniopril        Plan:  1 year follow up  Continue current ttreatment      I independently reviewed  holter   Thank you for allowing me to participate in the care of Monica Solares. Further evaluation will be based upon the patient's clinical course and testing results. All questions and concerns were addressed to the patient/family. Alternatives to my treatment were discussed. I have discussed the above stated plan and the patient verbalized understanding and agreed with the plan. NOTE: This report was transcribed using voice recognition software. Every effort was made to ensure accuracy, however, inadvertent computerized transcription errors may be present. Adarsh Kinney MD, [facility]   Office: [phone]     Scribe attestation: This note was scribed in the presence of Adarsh Kinney MD by Dominic Lomas RN    I, Dr. Adarsh Kinney personally performed the services described in this documentation as scribed by RN in my presence, and it is both accurate and complete.        Monica Solares is a 45 y.o. female being evaluated by a Virtual Visit (video visit) encounter to address concerns as mentioned above. A caregiver was present when appropriate.  Due to this being a TeleHealth encounter ([facility]), evaluation of the following organ

## 2020-07-20 ENCOUNTER — HOSPITAL ENCOUNTER (OUTPATIENT)
Dept: ULTRASOUND IMAGING | Age: 45
Discharge: HOME | End: 2020-07-20
Payer: COMMERCIAL

## 2020-07-20 ENCOUNTER — HOSPITAL ENCOUNTER (OUTPATIENT)
Dept: MAMMOGRAPHY | Age: 45
Discharge: HOME | End: 2020-07-20
Payer: COMMERCIAL

## 2020-07-20 PROCEDURE — 88305 TISSUE EXAM BY PATHOLOGIST: CPT

## 2020-07-20 PROCEDURE — 2709999900 US BREAST BIOPSY W LOC DEVICE 1ST LESION RIGHT

## 2020-07-20 PROCEDURE — 77065 DX MAMMO INCL CAD UNI: CPT

## 2020-07-27 NOTE — PROGRESS NOTES
Patient Risk: 15.3%  Average Population Risk: 10.6%       Breast History:  History of Previous Breast Biopsy:yes-papillary lesion  Self Breast Exams Completed:yes  Family History of Breast Cancer:no  Family History of Other Cancers:yes     Lymphoma-father-alive  Lumphoma-brother-alive    Ashkenazi [employer]:no    Gyne History:  Age of Menarche: 17 years  : 3  Para: 3  Age of Menopause:    Age of first live Birth: 31 years  History of Hysterectomy / ALVIN-BSO: No  History of OCP's/HRT: No     When and how long:  NA  Family History or personal history of Ovarian Cancer: no

## 2020-07-28 NOTE — PROGRESS NOTES
CC:Right breast mass    HPI:  Peter Goldman is a 45 y.o. woman who presents with new right breast mass, noted on screening mammogram.  U/s guided biopsy concerning for minimally sampled papillary lesion, 16mm in maximum dimension. She reports no breast complaints . She denies masses, skin changes, or nipple discharge. She does not have a family history of breast or ovarian cancer. I reviewed breast imaging with Dr. Kanika Vázquez today. Based on Tyrer-Cuzik 8.0 risk model, her lifetime risk of breast cancer is 15.3%. She has PVCs, mild cardiomyopathy, all stable, recent visit with cardiology. No chest pain, SOB, leg swelling. She is active, although she does not exercise. Non smoker, works from home, accompanied by her . Menstrual History  Menarche age 17. [id number]. Age first live birth 31  premenopausal   Ovaries/uterus intact  Oral contraceptives No   Hormone replacement No     Imaging and path 7/2/20-7/20/20   FINAL DIAGNOSIS:   Right breast, core biopsy of 16 x 7 x 4 mm retroareolar nodule at 9:00,   X-shaped clip:      - Fibrocystic changes, primarily variably prominent stromal fibrosis        (up to 8-9 contiguous mm on slide). - Negative for malignancy and significant atypia. - See Comment. COMMENT:   A microscopic fragment of detached epithelium (intimately   associated with one of the cores) raises the possibility of a minimally   sampled papillary lesion; clinical correlation is required, with lesion   excision as appropriate/desirable. ULTRASOUND-GUIDED RIGHT BREAST NEEDLE CORE BIOPSY        FINDINGS: Following discussion of the potential risks, benefits, and alternatives, informed consent was obtained. Right breast was prepped and draped in usual sterile fashion and 1% lidocaine used for local esthesia.         Under ultrasound guidance four 14-gauge core samples were obtained through the solid mass in the retroareolar right breast using a spring loaded biopsy device. Samples were collected and sent for appropriate pathologic analysis. An X shaped tissue    markers placed at the biopsy site. Patient tolerated the procedure well with no immediate postprocedural complications. Impression        1. Technically successful ultrasound guided right breast mass needle core biopsy with clip placement. Pathology results are pending. POSTPROCEDURE MAMMOGRAM.        CC and true lateral views of the right breast were obtained. Right breast is heterogeneously dense fibroglandular tissue. Tissue marker overlies the periareolar right breast site of ultrasound-guided core biopsy. Clip appears in good position. IMPRESSION:        1. Adequate placement of tissue marker at site of ultrasound-guided core biopsy. US BREAST LIMITED LEFT ON JULY 8, 2020        HISTORY: Abnormal bilateral mammogram        COMPARISON: Bilateral mammogram July 2, 2020        LIMITATIONS: None. FINDINGS:        Tomographic compression images were obtained in the region of nodular density on the right. Persistent nodule is noted behind the nipple. Ultrasound examination right breast retroareolar area demonstrates a solid nodule which measures 14 x 10 x 15 mm    indeterminate. Ultrasound-guided biopsy recommended. Ultrasound examination of the right axilla demonstrates normal fatty replaced axillary nodes. Tomographic compression images obtained of the left breast posteriorly in the upper aspect demonstrates no persistent nodule. Ultrasound of the left breast at the 2:00 position 6 cm from the nipple demonstrates incidental cyst measuring 7 x 16 x 4 mm. Findings discussed with the ultrasound technologist who will discuss the findings with the patient as well as the nursing service who will call the referring physician and discuss the findings with the patient and arrange for biopsy.         This study was performed and interpreted using Full Field Complete 07/29/2020   Allergen Reaction Noted    Morphine Anxiety 04/14/2010     Social History     Tobacco Use    Smoking status: Never Smoker    Smokeless tobacco: Never Used   Substance Use Topics    Alcohol use: No    Drug use: No     Review of Systems    Current Outpatient Medications on File Prior to Visit   Medication Sig Dispense Refill    enalapril (VASOTEC) 2.5 MG tablet TAKE 1 TABLET BY MOUTH DAILY 90 tablet 0    carvedilol (COREG) 3.125 MG tablet TAKE 1 TABLET BY MOUTH TWICE DAILY WITH MEALS 180 tablet 1     No current facility-administered medications on file prior to visit. Exam:  /84 (Site: Left Upper Arm, Position: Sitting, Cuff Size: Medium Adult)   Pulse 82   Temp 97.8 °F (36.6 °C) (Tympanic)   Ht 6' (1.829 m)   Wt 225 lb (102.1 kg)   BMI 30.52 kg/m²   Physical Exam  Constitutional: She appears well-nourished. No apparent distress. Head: Normocephalic and atraumatic. Eyes: EOM are normal. Pupils are equal, round, and reactive to light. Neck: Neck supple. No tracheal deviation present. Cardiovascular: Regular rate and rhythm. Pulmonary: Respirations are non-labored no wheezing. Lymphatics: No palpable cervical, supraclavicular, or axillary lymphadenopathy. Breast:   Right: No masses, nipple discharge, bruising over UOQ, adjacent to edge of areola. Left: No masses, nipple discharge, or overlying skin changes. There is no axillary lymphadenopathy palpated bilaterally. Skin: No rash noted. Multiple nevi, none concerning, sees derm annually, s/p multiple excisions of nevi  Extremities: Well perfused, no edema. Neurologic: Alert and oriented. Assessment and Plan:    Right breast mass, 1.6cm, concern for papilloma. I recommend surgical excisional biopsy, to fully assess lesion and for better risk assessment. We reviewed the concept of concordance.  We discussed the potential outcomes of an excisional breast biopsy which would include benign pathology as well as a atypia or malignancy. We discussed that based on final pathology she may require additional surgery or other treatments. Final pathology may also alter her follow up and screening recommendations. I recommend an excisional breast biopsy in the operating room. We discussed the technique of RFID seed localization. We would then proceed to the operating room to remove the abnormal area within the breast. We discussed the risks and benefits of surgery which include but are not limited to bleeding, infection, wound complications, unappealing cosmetics, and the need to return to the operating room for a second procedure based on final pathology. We reviewed expectations for recovery. She has stable cardiomyopathy, no symptoms, recent cardiology visit. She will need covid testing, but no additional preop workup. She and her  are happy with the discussion and plan.

## 2020-07-29 ENCOUNTER — OFFICE VISIT (OUTPATIENT)
Dept: SURGERY | Age: 45
End: 2020-07-29
Payer: COMMERCIAL

## 2020-07-29 VITALS
HEART RATE: 82 BPM | HEIGHT: 72 IN | BODY MASS INDEX: 30.48 KG/M2 | TEMPERATURE: 97.8 F | SYSTOLIC BLOOD PRESSURE: 132 MMHG | DIASTOLIC BLOOD PRESSURE: 84 MMHG | WEIGHT: 225 LBS

## 2020-07-29 PROCEDURE — 99204 OFFICE O/P NEW MOD 45 MIN: CPT | Performed by: SURGERY

## 2020-07-30 ENCOUNTER — TELEPHONE (OUTPATIENT)
Dept: SURGERY | Age: 45
End: 2020-07-30

## 2020-07-30 NOTE — TELEPHONE ENCOUNTER
Spoke with patient, date of 8/10 did not work for patient. Surgery is scheduled for 8/18 at 12:00, arrival time of 10:00. Patient is to have Covid testing done 8/12 or 8/13. Patient is to have pre-op physical done prior to surgery and also will need to obtain cardiac clearance as her clearance will be out of the 30 day period. Patient verbalized understanding.

## 2020-08-05 ENCOUNTER — TELEPHONE (OUTPATIENT)
Dept: CARDIOLOGY CLINIC | Age: 45
End: 2020-08-05

## 2020-08-05 NOTE — LETTER
[facility] CARDIOLOGY[address]  Dept: [phone]  Dept Fax: [phone]      2020    Eduard Bullard  : 1975  DOS: 2020    To Whom it May Concern:    Sushil Peterson has been evaluated for cardiac clearance. Based on diagnostic testing, Sushil Peterson is considered at a low risk for surgery. There is no further cardiac testing that could be done to lower the risk. Please let my office know if you have any questions or concerns.       Roslyn Gosselin, MD                           [facility] serving [place] and [place]

## 2020-08-05 NOTE — TELEPHONE ENCOUNTER
CARDIAC CLEARANCE     What type of procedure are you having? Breast sx  Which physician is performing your procedure? ? [facility]    When is your procedure scheduled for? 8/18/20  [facility]  Are you taking Blood Thinners? no   If so what? (Name/dose/frequesncy)     Does the surgeon want you to stop your blood thinner? If so for how long?     Phone Number and Contact Name for Physicians office: [phone]    Fax number to send information:

## 2020-08-06 ENCOUNTER — OFFICE VISIT (OUTPATIENT)
Dept: FAMILY MEDICINE CLINIC | Age: 45
End: 2020-08-06
Payer: COMMERCIAL

## 2020-08-06 VITALS
HEART RATE: 83 BPM | BODY MASS INDEX: 31 KG/M2 | TEMPERATURE: 97.9 F | SYSTOLIC BLOOD PRESSURE: 114 MMHG | OXYGEN SATURATION: 98 % | WEIGHT: 228.6 LBS | DIASTOLIC BLOOD PRESSURE: 90 MMHG

## 2020-08-06 PROCEDURE — 99202 OFFICE O/P NEW SF 15 MIN: CPT | Performed by: FAMILY MEDICINE

## 2020-08-06 ASSESSMENT — PATIENT HEALTH QUESTIONNAIRE - PHQ9
1. LITTLE INTEREST OR PLEASURE IN DOING THINGS: 0
SUM OF ALL RESPONSES TO PHQ QUESTIONS 1-9: 0
SUM OF ALL RESPONSES TO PHQ QUESTIONS 1-9: 0
SUM OF ALL RESPONSES TO PHQ9 QUESTIONS 1 & 2: 0
2. FEELING DOWN, DEPRESSED OR HOPELESS: 0

## 2020-08-06 NOTE — PROGRESS NOTES
-Preoperative Consultation-      Patient name: Suman Noe    YOB: 1975    Date of Service: 8/6/2020    Chief Complaint   Patient presents with    Pre-op Exam     Removing a lump from right breast. Surgery on 8/18. This patient presents to the office today for a preoperative consultation    Surgery type: right breast radiofrequency tag excisional breast biopsy  Surgeon: Dr. Sacha Mai  Date of operation: August 18, 2020   Location: [facility].      Conservative therapy includes: N/A. Planned anesthesia: Local and Topical anesthesia   Known anesthesia problems: None   Bleeding risk: No recent or remote history of abnormal bleeding  Personal or FH of DVT/PE: No    Patient objection to receiving blood products: No    Patient Active Problem List   Diagnosis    Status post induction of labor    Spontaneous vaginal delivery    Strain    Benign neoplasm of skin    PVC (premature ventricular contraction)    Ventricular tachycardia (HCC)       Past Surgical History:   Procedure Laterality Date    FRACTURE SURGERY      leg    KNEE SURGERY      US BREAST NEEDLE BIOPSY RIGHT  7/20/2020    US BREAST NEEDLE BIOPSY RIGHT 7/20/2020 [facility] ULTRASOUND     Family History   Problem Relation Age of Onset    Cancer Father     Asthma Brother     Allergies Son      Allergies   Allergen Reactions    Morphine Anxiety     Current Outpatient Medications   Medication Sig Dispense Refill    enalapril (VASOTEC) 2.5 MG tablet TAKE 1 TABLET BY MOUTH DAILY 90 tablet 0    carvedilol (COREG) 3.125 MG tablet TAKE 1 TABLET BY MOUTH TWICE DAILY WITH MEALS 180 tablet 1     No current facility-administered medications for this visit.        Social History     Socioeconomic History    Marital status:      Spouse name: Not on file    Number of children: Not on file    Years of education: Not on file    Highest education level: Not on file   Occupational History    Not on file   Social Needs  Financial resource strain: Not on file   [place] insecurity     Worry: Not on file     Inability: Not on file   [employer] needs     Medical: Not on file     Non-medical: Not on file   Tobacco Use    Smoking status: Never Smoker    Smokeless tobacco: Never Used   Substance and Sexual Activity    Alcohol use: No    Drug use: No    Sexual activity: Yes     Partners: Male   Lifestyle    Physical activity     Days per week: Not on file     Minutes per session: Not on file    Stress: Not on file   Relationships    Social connections     Talks on phone: Not on file     Gets together: Not on file     Attends Buddhism service: Not on file     Active member of club or organization: Not on file     Attends meetings of clubs or organizations: Not on file     Relationship status: Not on file    Intimate partner violence     Fear of current or ex partner: Not on file     Emotionally abused: Not on file     Physically abused: Not on file     Forced sexual activity: Not on file   Other Topics Concern    Not on file   Social History Narrative    Not on file     Review of Systems:  A comprehensive review of systems was negative except for what was noted in the HPI. Physical Exam:   Vitals:    08/06/20 0732   BP: (!) 114/90   Pulse: 83   Temp: 97.9 °F (36.6 °C)   SpO2: 98%   Weight: 228 lb 9.6 oz (103.7 kg)      Wt Readings from Last 2 Encounters:   08/06/20 228 lb 9.6 oz (103.7 kg)   07/29/20 225 lb (102.1 kg)     BP Readings from Last 3 Encounters:   08/06/20 (!) 114/90   07/29/20 132/84   04/10/19 124/70      Constitutional: She is oriented to person, place, and time. She appears well-developed and well-nourished. No distress. HENT:   Head: Normocephalic and atraumatic. Mouth/Throat: Uvula is midline, oropharynx is clear and moist and mucous membranes are normal.   Eyes: Conjunctivae and EOM are normal. Pupils are equal, round, and reactive to light. Neck: Trachea normal and normal range of motion. -Emergent procedure: No  -Active Cardiac Condition: No (decompensated HF, Arrhythmia, MI <3 weeks, severe valve disease)  -Risk Level of Procedure: Low Risk (endoscopy, superficial skin, breast, ambulatory, or cataract, etc.)  -Revised Cardiac Risk Index Risk factors: None  -Exercise Tolerance before Surgery: good        HAS-BLED Score                            Risk Factors      Points   Hypertension  Uncontrolled, >701 mmHg systolic   No=0   Renal disease  Dialysis, transplant, Cr>2.26 mg/dL or >200 µmol/L   No=0   Liver disease   Cirrhosis or bilirubin >2x normal with AST/ALT/AP >3x normal   No=0   Stroke history   No=0   Prior history of major bleeding or predisposition to bleeding     No=0   Labile INR  Unstable/high INRs, time in therapeutic range <60%   No=0   Age >71   No=0   Medication usage predisposing to bleeding   Aspirin, Clopidogrel, Prasugrel, Ticagrelor, NSAIDs, warfarin, DOACs   No=0   Alcohol use   >7 drinks/week   No=0     HAS-BLED score:    0:  Risk was 0.9% in one validation study and 1.13 bleeds per 100 patient-years in another validation study. ICD-10-CM    1. Preop examination  Z01.818    2. Breast lump on right side at 9 o'clock position  N63.10    3. Ventricular tachycardia (HCC)  I47.2    4. Essential hypertension  I10       Plan:   1. Preoperative workup as follows: none, COVID-19 testing (about 3-5 days prior to procedure - depending on surgical site recommendation)     2. Change in medication regiment before surgery: none  -On anti-platelet drug(s): None  -On anticoagulant drug: None  -On SGLT-2 inhibitor drug: None    3.  Prophylaxis for cardiac events with perioperative beta-blockers: none; Currently taking  carvedilol  ACC/AHA indications for pre-operative beta-blocker use:    · Vascular surgery with history of positive stress test  · Intermediate or high risk surgery with history of CAD   · Intermediate or high risk surgery with multiple clinical predictors of CAD- 2 of the following: history of compensated or prior heart failure, history of cerebrovascular disease, DM, or renal insufficiency  Routine administration of higher-dose, long-acting metoprolol in beta-blocker-naïve patients on the day of surgery, and in the absence of dose titration is associated with an overall increase in mortality. Beta-blockers should be started days to weeks prior to surgery and titrated to pulse < 70.    4. Deep vein thrombosis prophylaxis: not indicated; counseled patient on ambulating as much as possible and minimizing sedentary time as well as discussed about moving lower extremities around while lying down to increase circulation    5. Antibiotic prophylaxis: none    6. Smoking cessation and education provided (if applicable)    7. Further recommendations from consultants: None    8. Scanned pre-op form to media section and faxed to number listed on form (if applicable)    Pre-op assessment: No contraindications to planned surgery    Other: I discussed with patient about avoiding NSAIDS at least 7 days prior to procedure and afterwards. Counseled patient to only use Tylenol for pain and if pain persists to schedule an appointment with me. Bertin Sanchez 177    Electronically signed by Narinder Storm MD on 8/6/2020 at 8:16 AM.

## 2020-08-07 NOTE — PROGRESS NOTES
Patient was scheduled for Covid testing on 8-11 @[facility]. I left her a message that she needed to come in on 8-12 instead and that [address] wasn't open on Wednesdays. I also informed her that [place] was open 8-3 and that she didn't need an appointment.

## 2020-08-13 ENCOUNTER — OFFICE VISIT (OUTPATIENT)
Dept: PRIMARY CARE CLINIC | Age: 45
End: 2020-08-13

## 2020-08-13 NOTE — PATIENT INSTRUCTIONS

## 2020-08-14 ENCOUNTER — TELEPHONE (OUTPATIENT)
Dept: SURGERY | Age: 45
End: 2020-08-14

## 2020-08-14 NOTE — TELEPHONE ENCOUNTER
Left VM for patient that surgery times for Tuesday 8/18 has been changed. Patient's surgery is now at 21 [phone] with an arrival time of 0830.   Asked patient to return call to office to confirm

## 2020-08-15 LAB
SARS-COV-2: NOT DETECTED
SOURCE: NORMAL

## 2020-08-17 ENCOUNTER — HOSPITAL ENCOUNTER (OUTPATIENT)
Dept: ULTRASOUND IMAGING | Age: 45
Discharge: HOME | End: 2020-08-17
Payer: COMMERCIAL

## 2020-08-17 ENCOUNTER — ANESTHESIA EVENT (OUTPATIENT)
Dept: OPERATING ROOM | Age: 45
End: 2020-08-17
Payer: COMMERCIAL

## 2020-08-17 ENCOUNTER — HOSPITAL ENCOUNTER (OUTPATIENT)
Dept: MAMMOGRAPHY | Age: 45
Discharge: HOME | End: 2020-08-17
Payer: COMMERCIAL

## 2020-08-17 PROCEDURE — 77065 DX MAMMO INCL CAD UNI: CPT

## 2020-08-17 PROCEDURE — 2709999900 US PLACE BREAST LOC DEVICE 1ST LESION RIGHT

## 2020-08-17 NOTE — PROGRESS NOTES
[facility] PRE-SURGICAL TESTING INSTRUCTIONS                              PRIOR TO PROCEDURE DATE:  1. Please follow any guidelines/instructions prior to your procedure as advised by your surgeon. 2. Arrange for someone to drive you home and be with you for the first 24 hours after discharge for your safety after your procedure for which you received sedation. Ensure it is someone we can share information with regarding your discharge. 3. You must contact your surgeon for instructions IF:   You are taking any blood thinners, aspirin, anti-inflammatory or vitamin E.   There is a change in your physical condition such as a cold, fever, rash, cuts, sores or any other infection, especially near your surgical site. 4. Do not drink alcohol the day before or day of your procedure. 5. A Pre-op History and Physical for surgery MUST be completed by your Physician or Urgent Care within 30 days of your procedure date. Please bring a copy with you on the day of your procedure and along with any other testing performed. THE DAY OF YOUR PROCEDURE:  1. Follow instructions for ARRIVAL TIME as DIRECTED BY YOUR SURGEON. I    2. Enter the MAIN entrance from [place] and follow the signs to the free [employer] or [employer] parking (offered free of charge 6am-5pm). 3. Enter the Main Entrance of the hospital (do not enter from the lower level of the parking garage). Upon entrance, check in with the  at the main desk on your left. If no one is available at the desk, proceed into the [facility] Waiting Room and go through the door directly into the [facility]. There is a Check-in desk ACROSS from Room 5 (marked with a sign hanging from the ceiling). The phone number for the surgery center is [phone]. 4. Please call [phone] option #2 option #2 if you have not been preregistered yet. On the day of your procedure bring your insurance card and photo ID.  You will be registered at your bedside once brought back to your room. 5. DO NOT EAT ANYTHING eight hours prior to surgery. May have 8 ounces of water 4 hours prior to surgery. 6. MEDICATIONS    Take the following medications with a SMALL sip of water: Coreg   Use your usual dose of inhalers the morning of surgery. BRING your rescue inhaler with you to hospital.    Anesthesia does NOT want you to take insulin the morning of surgery. They will control your blood sugar while you are at the hospital. Please contact your ordering physician for instructions regarding your insulin the night before your procedure. If you have an insulin pump, please keep it set on basal rate. 7. Do not swallow water when brushing teeth. No gum, candy, mints or ice chips. Refrain from smoking or at least decrease the amount. 8. Dress in loose, comfortable clothing appropriate for redressing after your procedure. Do not wear jewelry (including body piercings), make-up (especially NO eye make-up), fingernail polish (NO toenail polish if foot/leg surgery), lotion, powders or metal hairclips. 9. Dentures, glasses, or contacts will need to be removed before your procedure. Bring cases for your glasses, contacts, dentures, or hearing aids to protect them while you are in surgery. 10. If you use a CPAP, please bring it with you on the day of your procedure. 11. We recommend that valuable personal  belongings such as cash, cell phones, e-tablets or jewelry, be left at home during your stay. The hospital will not be responsible for valuables that are not secured in the hospital safe. However, if your insurance requires a co-pay, you may want to bring a method of payment, i.e. Check or credit card, if you wish to pay your co-pay the day of surgery. 12. If you are to stay overnight, you may bring a bag with personal items.  Please have any large items you may need brought in by your family after your arrival to your hospital room.    13. If you have a Living Will or Durable Power of , please bring a copy on the day of your procedure. 15. With your permission, one family member may accompany you while you are being prepared for surgery. Once you are ready, additional family members may join you. HOW WE KEEP YOU SAFE and WORK TO PREVENT SURGICAL SITE INFECTIONS:  1. Health care workers should always check your ID bracelet to verify your name and birth date. You will be asked many times to state your name, date of birth, and allergies. 2. Health care workers should always clean their hands with soap or alcohol gel before providing care to you. It is okay to ask anyone if they cleaned their hands before they touch you. 3. You will be actively involved in verifying the type of procedure you are having and ensuring the correct surgical site. This will be confirmed multiple times prior to your procedure. Do NOT katty your surgery site UNLESS instructed to by your surgeon. 4. Do not shave or wax for 72 hours prior to procedure near your operative site. Shaving with a razor can irritate your skin and make it easier to develop an infection. On the day of your procedure, any hair that needs to be removed near the surgical site will be clipped by a healthcare worker using a special clippers designed to avoid skin irritation. 5. When you are in the operating room, your surgical site will be cleansed with a special soap, and in most cases, you will be given an antibiotic before the surgery begins. What to expect AFTER YOUR PROCEDURE:  1. Immediately following your procedure, your will be taken to the PACU for the first phase of your recovery. Your nurse will help you recover from any potential side effects of anesthesia, such as extreme drowsiness, changes in your vital signs or breathing patterns. Nausea, headache, muscle aches, or sore throat may also occur after anesthesia.   Your nurse will help you manage these potential side effects. 2. For comfort and safety, arrange to have someone at home with you for the first 24 hours after discharge. 3. You and your family will be given written instructions about your diet, activity, dressing care, medications, and return visits. 4. Once at home, should issues with nausea, pain, or bleeding occur, or should you notice any signs of infection, you should call your surgeon. 5. Always clean your hands before and after caring for your wound. Do not let your family touch your surgery site without cleaning their hands. 6. Narcotic pain medications can cause significant constipation. You may want to add a stool softener to your postoperative medication schedule or speak to your surgeon on how best to manage this SIDE EFFECT. SPECIAL INSTRUCTIONS    Thank you for allowing us to care for you. We strive to exceed your expectations in the delivery of care and service provided to you and your family. If you need to contact us for any reason, please call us at [phone]    Instructions reviewed with patient during preadmission testing phone interview. Ever Paintign. 8/17/2020 .10:10 AM      ADDITIONAL EDUCATIONAL INFORMATION REVIEWED PER PHONE WITH YOU AND/OR YOUR FAMILY:  Yes Antibacterial Soap

## 2020-08-18 ENCOUNTER — HOSPITAL ENCOUNTER (OUTPATIENT)
Dept: MAMMOGRAPHY | Age: 45
Discharge: HOME | End: 2020-08-18
Payer: COMMERCIAL

## 2020-08-18 ENCOUNTER — HOSPITAL ENCOUNTER (OUTPATIENT)
Age: 45
Setting detail: OUTPATIENT SURGERY
Discharge: HOME | End: 2020-08-18
Attending: SURGERY | Admitting: SURGERY
Payer: COMMERCIAL

## 2020-08-18 ENCOUNTER — ANESTHESIA (OUTPATIENT)
Dept: OPERATING ROOM | Age: 45
End: 2020-08-18
Payer: COMMERCIAL

## 2020-08-18 VITALS
DIASTOLIC BLOOD PRESSURE: 74 MMHG | TEMPERATURE: 97.3 F | OXYGEN SATURATION: 98 % | RESPIRATION RATE: 16 BRPM | SYSTOLIC BLOOD PRESSURE: 108 MMHG

## 2020-08-18 VITALS
SYSTOLIC BLOOD PRESSURE: 117 MMHG | RESPIRATION RATE: 18 BRPM | HEIGHT: 72 IN | BODY MASS INDEX: 30.88 KG/M2 | WEIGHT: 228 LBS | OXYGEN SATURATION: 96 % | HEART RATE: 73 BPM | DIASTOLIC BLOOD PRESSURE: 79 MMHG | TEMPERATURE: 98.4 F

## 2020-08-18 LAB — PREGNANCY, URINE: NEGATIVE

## 2020-08-18 PROCEDURE — 2580000003 HC RX 258: Performed by: SURGERY

## 2020-08-18 PROCEDURE — 3700000001 HC ADD 15 MINUTES (ANESTHESIA): Performed by: SURGERY

## 2020-08-18 PROCEDURE — 2720000010 HC SURG SUPPLY STERILE: Performed by: SURGERY

## 2020-08-18 PROCEDURE — 19125 EXCISION BREAST LESION: CPT | Performed by: SURGERY

## 2020-08-18 PROCEDURE — 76098 X-RAY EXAM SURGICAL SPECIMEN: CPT

## 2020-08-18 PROCEDURE — 2580000003 HC RX 258: Performed by: ANESTHESIOLOGY

## 2020-08-18 PROCEDURE — 7100000000 HC PACU RECOVERY - FIRST 15 MIN: Performed by: SURGERY

## 2020-08-18 PROCEDURE — 14001 TIS TRNFR TRUNK 10.1-30SQCM: CPT | Performed by: SURGERY

## 2020-08-18 PROCEDURE — 88307 TISSUE EXAM BY PATHOLOGIST: CPT

## 2020-08-18 PROCEDURE — 84703 CHORIONIC GONADOTROPIN ASSAY: CPT

## 2020-08-18 PROCEDURE — 7100000010 HC PHASE II RECOVERY - FIRST 15 MIN: Performed by: SURGERY

## 2020-08-18 PROCEDURE — 2500000003 HC RX 250 WO HCPCS: Performed by: NURSE ANESTHETIST, CERTIFIED REGISTERED

## 2020-08-18 PROCEDURE — 7100000011 HC PHASE II RECOVERY - ADDTL 15 MIN: Performed by: SURGERY

## 2020-08-18 PROCEDURE — 2709999900 HC NON-CHARGEABLE SUPPLY: Performed by: SURGERY

## 2020-08-18 PROCEDURE — 3700000000 HC ANESTHESIA ATTENDED CARE: Performed by: SURGERY

## 2020-08-18 PROCEDURE — 6360000002 HC RX W HCPCS: Performed by: SURGERY

## 2020-08-18 PROCEDURE — 6360000002 HC RX W HCPCS: Performed by: NURSE ANESTHETIST, CERTIFIED REGISTERED

## 2020-08-18 PROCEDURE — 3600000014 HC SURGERY LEVEL 4 ADDTL 15MIN: Performed by: SURGERY

## 2020-08-18 PROCEDURE — 2500000003 HC RX 250 WO HCPCS: Performed by: SURGERY

## 2020-08-18 PROCEDURE — 3600000004 HC SURGERY LEVEL 4 BASE: Performed by: SURGERY

## 2020-08-18 PROCEDURE — 7100000001 HC PACU RECOVERY - ADDTL 15 MIN: Performed by: SURGERY

## 2020-08-18 RX ORDER — SODIUM CHLORIDE 0.9 % (FLUSH) 0.9 %
10 SYRINGE (ML) INJECTION PRN
Status: DISCONTINUED | OUTPATIENT
Start: 2020-08-18 | End: 2020-08-18 | Stop reason: HOSPADM

## 2020-08-18 RX ORDER — LABETALOL 20 MG/4 ML (5 MG/ML) INTRAVENOUS SYRINGE
5 EVERY 10 MIN PRN
Status: DISCONTINUED | OUTPATIENT
Start: 2020-08-18 | End: 2020-08-18 | Stop reason: HOSPADM

## 2020-08-18 RX ORDER — ONDANSETRON 2 MG/ML
INJECTION INTRAMUSCULAR; INTRAVENOUS PRN
Status: DISCONTINUED | OUTPATIENT
Start: 2020-08-18 | End: 2020-08-18 | Stop reason: SDUPTHER

## 2020-08-18 RX ORDER — GLYCOPYRROLATE 0.2 MG/ML
INJECTION INTRAMUSCULAR; INTRAVENOUS PRN
Status: DISCONTINUED | OUTPATIENT
Start: 2020-08-18 | End: 2020-08-18 | Stop reason: SDUPTHER

## 2020-08-18 RX ORDER — DEXAMETHASONE SODIUM PHOSPHATE 4 MG/ML
INJECTION, SOLUTION INTRA-ARTICULAR; INTRALESIONAL; INTRAMUSCULAR; INTRAVENOUS; SOFT TISSUE PRN
Status: DISCONTINUED | OUTPATIENT
Start: 2020-08-18 | End: 2020-08-18 | Stop reason: SDUPTHER

## 2020-08-18 RX ORDER — FENTANYL CITRATE 50 UG/ML
25 INJECTION, SOLUTION INTRAMUSCULAR; INTRAVENOUS EVERY 5 MIN PRN
Status: DISCONTINUED | OUTPATIENT
Start: 2020-08-18 | End: 2020-08-18 | Stop reason: HOSPADM

## 2020-08-18 RX ORDER — KETAMINE HYDROCHLORIDE 50 MG/ML
INJECTION, SOLUTION, CONCENTRATE INTRAMUSCULAR; INTRAVENOUS PRN
Status: DISCONTINUED | OUTPATIENT
Start: 2020-08-18 | End: 2020-08-18 | Stop reason: SDUPTHER

## 2020-08-18 RX ORDER — SODIUM CHLORIDE 0.9 % (FLUSH) 0.9 %
10 SYRINGE (ML) INJECTION EVERY 12 HOURS SCHEDULED
Status: DISCONTINUED | OUTPATIENT
Start: 2020-08-18 | End: 2020-08-18 | Stop reason: HOSPADM

## 2020-08-18 RX ORDER — SODIUM CHLORIDE, SODIUM LACTATE, POTASSIUM CHLORIDE, CALCIUM CHLORIDE 600; 310; 30; 20 MG/100ML; MG/100ML; MG/100ML; MG/100ML
INJECTION, SOLUTION INTRAVENOUS CONTINUOUS
Status: DISCONTINUED | OUTPATIENT
Start: 2020-08-18 | End: 2020-08-18 | Stop reason: HOSPADM

## 2020-08-18 RX ORDER — ONDANSETRON 2 MG/ML
4 INJECTION INTRAMUSCULAR; INTRAVENOUS
Status: DISCONTINUED | OUTPATIENT
Start: 2020-08-18 | End: 2020-08-18 | Stop reason: HOSPADM

## 2020-08-18 RX ORDER — MAGNESIUM HYDROXIDE 1200 MG/15ML
LIQUID ORAL CONTINUOUS PRN
Status: COMPLETED | OUTPATIENT
Start: 2020-08-18 | End: 2020-08-18

## 2020-08-18 RX ORDER — HYDROCODONE BITARTRATE AND ACETAMINOPHEN 5; 325 MG/1; MG/1
1 TABLET ORAL EVERY 6 HOURS PRN
Qty: 5 TABLET | Refills: 0 | Status: SHIPPED | OUTPATIENT
Start: 2020-08-18 | End: 2020-08-21

## 2020-08-18 RX ORDER — FENTANYL CITRATE 50 UG/ML
50 INJECTION, SOLUTION INTRAMUSCULAR; INTRAVENOUS EVERY 5 MIN PRN
Status: DISCONTINUED | OUTPATIENT
Start: 2020-08-18 | End: 2020-08-18 | Stop reason: HOSPADM

## 2020-08-18 RX ORDER — PROPOFOL 10 MG/ML
INJECTION, EMULSION INTRAVENOUS PRN
Status: DISCONTINUED | OUTPATIENT
Start: 2020-08-18 | End: 2020-08-18 | Stop reason: SDUPTHER

## 2020-08-18 RX ORDER — HYDRALAZINE HYDROCHLORIDE 20 MG/ML
5 INJECTION INTRAMUSCULAR; INTRAVENOUS EVERY 5 MIN PRN
Status: DISCONTINUED | OUTPATIENT
Start: 2020-08-18 | End: 2020-08-18 | Stop reason: HOSPADM

## 2020-08-18 RX ORDER — BUPIVACAINE HYDROCHLORIDE 2.5 MG/ML
INJECTION, SOLUTION EPIDURAL; INFILTRATION; INTRACAUDAL PRN
Status: DISCONTINUED | OUTPATIENT
Start: 2020-08-18 | End: 2020-08-18 | Stop reason: ALTCHOICE

## 2020-08-18 RX ORDER — LIDOCAINE HYDROCHLORIDE 20 MG/ML
INJECTION, SOLUTION INFILTRATION; PERINEURAL PRN
Status: DISCONTINUED | OUTPATIENT
Start: 2020-08-18 | End: 2020-08-18 | Stop reason: SDUPTHER

## 2020-08-18 RX ORDER — HYDROCODONE BITARTRATE AND ACETAMINOPHEN 5; 325 MG/1; MG/1
1 TABLET ORAL EVERY 6 HOURS PRN
Qty: 42 TABLET | Refills: 0 | Status: SHIPPED | OUTPATIENT
Start: 2020-08-18 | End: 2020-08-18 | Stop reason: SDUPTHER

## 2020-08-18 RX ORDER — ENALAPRILAT 2.5 MG/2ML
1.25 INJECTION INTRAVENOUS
Status: DISCONTINUED | OUTPATIENT
Start: 2020-08-18 | End: 2020-08-18 | Stop reason: HOSPADM

## 2020-08-18 RX ORDER — FENTANYL CITRATE 50 UG/ML
INJECTION, SOLUTION INTRAMUSCULAR; INTRAVENOUS PRN
Status: DISCONTINUED | OUTPATIENT
Start: 2020-08-18 | End: 2020-08-18 | Stop reason: SDUPTHER

## 2020-08-18 RX ORDER — MIDAZOLAM HYDROCHLORIDE 1 MG/ML
INJECTION INTRAMUSCULAR; INTRAVENOUS PRN
Status: DISCONTINUED | OUTPATIENT
Start: 2020-08-18 | End: 2020-08-18 | Stop reason: SDUPTHER

## 2020-08-18 RX ADMIN — KETAMINE HYDROCHLORIDE 25 MG: 50 INJECTION, SOLUTION INTRAMUSCULAR; INTRAVENOUS at 09:56

## 2020-08-18 RX ADMIN — SODIUM CHLORIDE, SODIUM LACTATE, POTASSIUM CHLORIDE, AND CALCIUM CHLORIDE: 600; 310; 30; 20 INJECTION, SOLUTION INTRAVENOUS at 10:17

## 2020-08-18 RX ADMIN — LIDOCAINE HYDROCHLORIDE 100 MG: 20 INJECTION, SOLUTION INFILTRATION; PERINEURAL at 09:46

## 2020-08-18 RX ADMIN — CEFAZOLIN 2 G: 10 INJECTION, POWDER, FOR SOLUTION INTRAVENOUS at 09:49

## 2020-08-18 RX ADMIN — GLYCOPYRROLATE 0.2 MG: 0.2 INJECTION INTRAMUSCULAR; INTRAVENOUS at 09:52

## 2020-08-18 RX ADMIN — PROPOFOL 200 MG: 10 INJECTION, EMULSION INTRAVENOUS at 09:46

## 2020-08-18 RX ADMIN — ONDANSETRON 4 MG: 2 INJECTION INTRAMUSCULAR; INTRAVENOUS at 09:50

## 2020-08-18 RX ADMIN — FAMOTIDINE 20 MG: 10 INJECTION, SOLUTION INTRAVENOUS at 09:50

## 2020-08-18 RX ADMIN — FENTANYL CITRATE 50 MCG: 50 INJECTION INTRAMUSCULAR; INTRAVENOUS at 10:06

## 2020-08-18 RX ADMIN — FENTANYL CITRATE 50 MCG: 50 INJECTION INTRAMUSCULAR; INTRAVENOUS at 09:46

## 2020-08-18 RX ADMIN — DEXAMETHASONE SODIUM PHOSPHATE 8 MG: 4 INJECTION, SOLUTION INTRAMUSCULAR; INTRAVENOUS at 09:50

## 2020-08-18 RX ADMIN — MIDAZOLAM HYDROCHLORIDE 2 MG: 2 INJECTION, SOLUTION INTRAMUSCULAR; INTRAVENOUS at 09:41

## 2020-08-18 RX ADMIN — FENTANYL CITRATE 50 MCG: 50 INJECTION INTRAMUSCULAR; INTRAVENOUS at 10:04

## 2020-08-18 RX ADMIN — SODIUM CHLORIDE, SODIUM LACTATE, POTASSIUM CHLORIDE, AND CALCIUM CHLORIDE: 600; 310; 30; 20 INJECTION, SOLUTION INTRAVENOUS at 09:15

## 2020-08-18 RX ADMIN — FENTANYL CITRATE 50 MCG: 50 INJECTION INTRAMUSCULAR; INTRAVENOUS at 10:08

## 2020-08-18 ASSESSMENT — PULMONARY FUNCTION TESTS
PIF_VALUE: 15
PIF_VALUE: 11
PIF_VALUE: 15
PIF_VALUE: 0
PIF_VALUE: 11
PIF_VALUE: 15
PIF_VALUE: 15
PIF_VALUE: 11
PIF_VALUE: 11
PIF_VALUE: 15
PIF_VALUE: 15
PIF_VALUE: 17
PIF_VALUE: 8
PIF_VALUE: 17
PIF_VALUE: 15
PIF_VALUE: 0
PIF_VALUE: 14
PIF_VALUE: 15
PIF_VALUE: 11
PIF_VALUE: 15
PIF_VALUE: 15
PIF_VALUE: 17
PIF_VALUE: 15
PIF_VALUE: 0
PIF_VALUE: 14
PIF_VALUE: 15
PIF_VALUE: 17
PIF_VALUE: 10
PIF_VALUE: 15
PIF_VALUE: 0
PIF_VALUE: 15
PIF_VALUE: 17
PIF_VALUE: 15
PIF_VALUE: 11
PIF_VALUE: 15
PIF_VALUE: 16
PIF_VALUE: 15
PIF_VALUE: 11
PIF_VALUE: 15
PIF_VALUE: 18
PIF_VALUE: 15
PIF_VALUE: 15
PIF_VALUE: 20
PIF_VALUE: 2
PIF_VALUE: 15
PIF_VALUE: 15

## 2020-08-18 ASSESSMENT — PAIN SCALES - GENERAL
PAINLEVEL_OUTOF10: 2
PAINLEVEL_OUTOF10: 0

## 2020-08-18 ASSESSMENT — PAIN - FUNCTIONAL ASSESSMENT: PAIN_FUNCTIONAL_ASSESSMENT: 0-10

## 2020-08-18 NOTE — ANESTHESIA POSTPROCEDURE EVALUATION
Department of Anesthesiology  Postprocedure Note    Patient: Scarlett Montero  MRN: [id number]  YOB: 1975  Date of evaluation: 8/18/2020  Time:  12:10 PM     Procedure Summary     Date:  08/18/20 Room / Location:  [address] [place] [address] / [facility]    Anesthesia Start:  0940 Anesthesia Stop:  1107    Procedure:  RIGHT BREAST RADIOFREQUENCY TAGGED EXCISIONAL BREAST BIOPSY (Right ) Diagnosis:       Breast mass      (Breast mass [N63.0])    Surgeon:  Kathleen Kim MD Responsible Provider:  Vanessa Mcclellan MD    Anesthesia Type:  general ASA Status:  2          Anesthesia Type: general    Radha Phase I: Radha Score: 10    Radha Phase II:      Last vitals: Reviewed and per EMR flowsheets.        Anesthesia Post Evaluation    Patient location during evaluation: PACU  Patient participation: complete - patient participated  Level of consciousness: awake  Airway patency: patent  Nausea & Vomiting: no nausea and no vomiting  Complications: no  Cardiovascular status: hemodynamically stable  Respiratory status: acceptable  Hydration status: stable

## 2020-08-18 NOTE — ANESTHESIA PRE PROCEDURE
Component Value Date    PREGTESTUR Negative 08/18/2020        ABGs: No results found for: PHART, PO2ART, KCF1ESB, LQD3VVA, BEART, W2EZNGDX     Type & Screen (If Applicable):  Lab Results   Component Value Date    LABABO O 04/27/2011    LABRH Positive 04/27/2011       Drug/Infectious Status (If Applicable):  No results found for: HIV, HEPCAB    COVID-19 Screening (If Applicable):   Lab Results   Component Value Date    COVID19 Not Detected 08/13/2020         Anesthesia Evaluation  Patient summary reviewed and Nursing notes reviewed  Airway: Mallampati: II  TM distance: >3 FB   Neck ROM: full  Mouth opening: > = 3 FB Dental: normal exam         Pulmonary:Negative Pulmonary ROS and normal exam  breath sounds clear to auscultation            Patient did not smoke on day of surgery. Cardiovascular:  Exercise tolerance: good (>4 METS),   (+) hypertension: mild,       ECG reviewed  Rhythm: regular  Rate: normal           Beta Blocker:  Dose within 24 Hrs         Neuro/Psych:   (+) neuromuscular disease:,             GI/Hepatic/Renal: Neg GI/Hepatic/Renal ROS            Endo/Other: Negative Endo/Other ROS                    Abdominal:       Abdomen: soft. Vascular: negative vascular ROS. Anesthesia Plan      general     ASA 2       Induction: intravenous. MIPS: Postoperative opioids intended and Prophylactic antiemetics administered. Anesthetic plan and risks discussed with patient. Use of blood products discussed with patient whom consented to blood products. Plan discussed with attending and CRNA.     Attending anesthesiologist reviewed and agrees with Pre Eval content              Gerry Hogan DO   8/18/2020

## 2020-08-18 NOTE — H&P
Chelita Taylor    [id number]    [facility] Same Day Surgery Update H & P  Department of General Surgery   Surgical Service   Pre-operative History and Physical  Last H & P within the last 30 days. DIAGNOSIS:   Breast mass [N63.0]    PROCEDURE:  NE EXCISE BREAST LES W XRAY MARKER [19125] (RIGHT BREAST RADIOFREQUENCY TAGGED EXCISIONAL BREAST BIOPSY)  NE BIOPSY OF BREAST, NEEDLE CORE [19100] (.)      HISTORY OF PRESENT ILLNESS:   Patient has a right breast mass found on routine mammogram.     Covid 19:  Patient denies fever, chills, cough or known exposure to Covid-19.   Patient reports they have not been quarantined at home since Covid-19 test.      Past Medical History:        Diagnosis Date    Hypertension     Spontaneous vaginal delivery     [id number]     Past Surgical History:        Procedure Laterality Date    FRACTURE SURGERY      leg    KNEE SURGERY      US BREAST NEEDLE BIOPSY RIGHT  7/20/2020    US BREAST NEEDLE BIOPSY RIGHT 7/20/2020 [facility]    US GUIDED NEEDLE LOC OF RIGHT BREAST  8/17/2020    US GUIDED NEEDLE LOC OF RIGHT BREAST 8/17/2020 Sky Morton MD [facility]     Past Social History:  Social History     Socioeconomic History    Marital status:      Spouse name: None    Number of children: None    Years of education: None    Highest education level: None   Occupational History    None   Social Needs    Financial resource strain: None    Food insecurity     Worry: None     Inability: None    Transportation needs     Medical: None     Non-medical: None   Tobacco Use    Smoking status: Never Smoker    Smokeless tobacco: Never Used   Substance and Sexual Activity    Alcohol use: No    Drug use: No    Sexual activity: Yes     Partners: Male   Lifestyle    Physical activity     Days per week: None     Minutes per session: None    Stress: None   Relationships    Social connections     Talks on phone: None     Gets together: None     Attends [employer] service: None     Active member of club or organization: None     Attends meetings of clubs or organizations: None     Relationship status: None    Intimate partner violence     Fear of current or ex partner: None     Emotionally abused: None     Physically abused: None     Forced sexual activity: None   Other Topics Concern    None   Social History Narrative    None         Medications Prior to Admission:      Prior to Admission medications    Medication Sig Start Date End Date Taking? Authorizing Provider   carvedilol (COREG) 3.125 MG tablet TAKE 1 TABLET BY MOUTH TWICE DAILY WITH MEALS 5/4/20  Yes Rebecca Stoneria APRN - CNP   enalapril (VASOTEC) 2.5 MG tablet TAKE 1 TABLET BY MOUTH DAILY 7/6/20   Rebeccathien Stoneria, APRN - CNP         Allergies:  Morphine    PHYSICAL EXAM:      /87   Pulse 73   Temp 97.7 °F (36.5 °C) (Oral)   Resp 18   Ht 6' (1.829 m)   Wt 228 lb (103.4 kg)   LMP 08/02/2020   SpO2 98%   BMI 30.92 kg/m²      Airway:  Airway patent with no audible stridor    Heart:  regular rate and rhythm, No murmur noted    Lungs:  No increased work of breathing, good air exchange, clear to auscultation bilaterally, no crackles or wheezing    Abdomen:  soft, non-distended, non-tender, no rebound tenderness or guarding, normal active bowel sounds and no masses palpated    ASSESSMENT AND PLAN     Patient is a 45 y.o. female with above specified procedure planned. 1.  Patient seen and focused exam done today- no new changes since last physical exam on 8-6-2020    2. Access to ancillary services are available per request of the provider.     Nyasia Azar, [address]     8/18/2020

## 2020-08-18 NOTE — PROGRESS NOTES
Received call from OR that pt is to be taken to OR early. Pt to [facility] for right breast surgery. Pt alert; oriented X 4; speech clear; breathing easily on RA; denies any pain; walks with steady gait without assist.  Urine Hcg \"negative. \"  #18 IV placed in left hand without problem. Ancef 2 g IVPB will go to OR with pt. Call light within reach. This RN called OR-03 to notify that pt ready for surgery except for visit by surgeon.

## 2020-08-18 NOTE — PROGRESS NOTES
PACU Transfer to [facility]    Vitals:    08/18/20 1145   BP: 117/79   Pulse: 73   Resp: 18   Temp: 98.4 °F (36.9 °C)   SpO2: 96%         Intake/Output Summary (Last 24 hours) at 8/18/2020 1159  Last data filed at 8/18/2020 1145  Gross per 24 hour   Intake 1695 ml   Output 0 ml   Net 1695 ml       Pain assessment:  present - adequately treated  Pain Level: 2(\"tolerable\")    Patient transferred to care of [facility] RN. Call placed to  to update and make aware of move to [facility].      8/18/2020 11:59 AM

## 2020-08-18 NOTE — FLOWSHEET NOTE
Patient received from the OR to PACU #7 post RIGHT BREAST RADIOFREQUENCY TAGGED EXCISIONAL BREAST BIOPSY - Right of Dr. Perla Mendes. Placed on PACU monitoring equipment. Report given per CRNA. Reportedly, no problems intra op. On arrival, patient is arouseable, aguayo on RA and denies pain.

## 2020-08-18 NOTE — OP NOTE
Operative Note      Patient: Chad Durand  YOB: 1975  MRN: [id number]    Date of Procedure: 8/18/2020    Pre-Op Diagnosis: Breast mass [N63.0], right    Post-Op Diagnosis: Same       Procedure(s):  RIGHT BREAST RADIOFREQUENCY TAGGED EXCISIONAL BREAST BIOPSY    Surgeon(s):  Soheila Cee MD    Assistant:   Resident: Janis Shepherd MD    Anesthesia: General    Estimated Blood Loss (mL): Minimal    Complications: None    Specimens:   ID Type Source Tests Collected by Time Destination   A : RIGHT BREAST BIOPSY Tissue Breast SURGICAL PATHOLOGY Soheila Cee MD 8/18/2020 1026        Implants:  * No implants in log *      Drains:   [REMOVED] Urethral Catheter Double-lumen 16 fr (Removed)       Findings: specimen radiograph with clip and RFID seed. Grossly 1.6cm lesion was posterior to these markers, within the specimen    Detailed Description of Procedure:   Postoperative Note    Chad Durand  YOB: 1975  [id number]    Pre-operative Diagnosis: right breast mass, biopsy proven papilloma    Post-operative Diagnosis: Same    Procedure: right RFID seed directed excisional breast biopsy, right tissue rearrangement procedure for area of  10 sq. cm. Anesthesia: General LMA    Surgeons/Assistants: Francine Delgado MD  Assistant:     Estimated Blood Loss: less than 50     Drains: none    Complications: none apparent by completion of procedure    Specimens: right breast tissue, subareolar, 2LL, 2SS, 1LA    Findings: specimen radiograph shows capture of lesion in question, punctate calcification, biopsy clip, and RFID seed     Post-Op Condition: Stable    Disposition: to recovery room    Description of Procedure:   Ms. Artem Dash is a 45 y.o. woman with right breast papilloma. She has elected to proceed with right excisional breast biopsy to assess possibility of upgrade diagnosis.   The indications for the planned procedure, along with the potential benefits and risks which include but are not limited to the risk of anesthesia, bleeding, infection, possible failed operation, possible need for additional surgery pending final pathologic assessment, sensation changes, and unappealing cosmetics were reviewed. All questions were answered and she agrees to proceed. Ms. Clyde Cardenas was met by me in the preoperative area. The surgical site was identified. Consent was obtained. The appropriate breast imaging was reviewed. She underwent an image guided localization procedure preoperatively. The subareolar lesion was again noted with the biopsy clip. The RFID seed was in close proximity to clip. She was brought to the operating room and placed supine with her arms extended on boards. She was appropriately positioned and padded. Compression stockings were placed. Appropriate antibiotics were administered within 60 minutes of the incision. Breast imaging was available in the room. After induction of general anesthesia, the appropriate World Health Organization timeout procedure was performed. The right breast and axillary region, upper arm, and chest wall were prepped and draped in the normal sterile fashion. There was one seed placed by the mammographer marking the right breast lesion. The skin and soft tissues over the proposed incision were infiltrated with local. A periareolar was made at the 6:00 location. Flaps were raised and dissection was carried out in a rectangular fashion around the RFID seed. Just posterior to the seed was obvious lesion, grossly consistent with papillary lesion. The specimen was excised in toto. The specimen was oriented with 2LL, 2SS, 1LA. It was submitted for specimen radiography which revealed the lesion in question with adequate radiographic margins. No additional margins were obtained  The wound was irrigated and hemostasis was obtained. In order to obtain the best cosmesis while closing the surgical cavity, I performed a tissue rearrangement.  A separate tissue incision was made superiorly and inferiorly. Flaps were elevated and advanced for a total area of 10 square cm. Hemostasis was reassessed. The incision was closed in layers using a 3-0 vicryl stitch followed by a 4-0 monocryl subcuticular approximation. Skin glue was applied. The instrument, sponge, and needle counts were correct. Ms. José Antonio Marie was awakened and placed into a surgical bra. She was taken to the recovery room in stable condition. Her family was notified of intraoperative findings.       Electronically signed by Alek Bruno MD on 8/18/20 at 10:49 AM EDT      Electronically signed by Alek Bruno MD on 8/18/2020 at 10:47 AM

## 2020-09-30 RX ORDER — ENALAPRIL MALEATE 2.5 MG/1
TABLET ORAL
Qty: 90 TABLET | Refills: 1 | Status: SHIPPED | OUTPATIENT
Start: 2020-09-30 | End: 2021-03-29

## 2020-11-20 RX ORDER — CARVEDILOL 3.12 MG/1
TABLET ORAL
Qty: 180 TABLET | Refills: 3 | Status: SHIPPED | OUTPATIENT
Start: 2020-11-20 | End: 2021-09-20

## 2020-11-20 NOTE — TELEPHONE ENCOUNTER
Received refill request for Carvedilol from [facility].      Last OV: 07/15/2020 VV w/ MXA    Last Refill: 05/04/2020 #180 w/ 1 refill    Next Appointment: on recall list for appt on 07/15/2021 w/ MXA

## 2021-03-27 DIAGNOSIS — I42.0 DILATED CARDIOMYOPATHY (HCC): ICD-10-CM

## 2021-03-29 RX ORDER — ENALAPRIL MALEATE 2.5 MG/1
TABLET ORAL
Qty: 90 TABLET | Refills: 1 | Status: SHIPPED | OUTPATIENT
Start: 2021-03-29 | End: 2021-09-20

## 2021-09-18 DIAGNOSIS — I42.0 DILATED CARDIOMYOPATHY (HCC): ICD-10-CM

## 2021-09-20 RX ORDER — ENALAPRIL MALEATE 2.5 MG/1
TABLET ORAL
Qty: 90 TABLET | Refills: 1 | Status: SHIPPED | OUTPATIENT
Start: 2021-09-20 | End: 2022-03-17

## 2021-09-20 RX ORDER — CARVEDILOL 3.12 MG/1
TABLET ORAL
Qty: 180 TABLET | Refills: 3 | Status: SHIPPED | OUTPATIENT
Start: 2021-09-20 | End: 2022-10-05 | Stop reason: SDUPTHER

## 2021-09-20 NOTE — TELEPHONE ENCOUNTER
Last OV: 07/15/20    Last Labs: [id number], CMP-2016    Last Refill: 03/29/21, 11/20/20    Next OV: no appt

## 2022-03-17 DIAGNOSIS — I42.0 DILATED CARDIOMYOPATHY (HCC): ICD-10-CM

## 2022-03-17 RX ORDER — ENALAPRIL MALEATE 2.5 MG/1
TABLET ORAL
Qty: 90 TABLET | Refills: 1 | Status: SHIPPED | OUTPATIENT
Start: 2022-03-17 | End: 2022-10-05 | Stop reason: SDUPTHER

## 2022-09-13 DIAGNOSIS — I42.0 DILATED CARDIOMYOPATHY (HCC): ICD-10-CM

## 2022-09-13 RX ORDER — CARVEDILOL 3.12 MG/1
TABLET ORAL
Qty: 180 TABLET | Refills: 1 | OUTPATIENT
Start: 2022-09-13

## 2022-09-13 RX ORDER — ENALAPRIL MALEATE 2.5 MG/1
TABLET ORAL
Qty: 90 TABLET | Refills: 1 | OUTPATIENT
Start: 2022-09-13

## 2022-09-13 NOTE — TELEPHONE ENCOUNTER
Has not been seen in 2 years needs an appointment with [facility] or [facility] first available for further refills

## 2022-09-13 NOTE — TELEPHONE ENCOUNTER
Received refill request for Enalapril & Carvedilol from [address] [place] [address] [address] : OV 4/10/19 MXA, VV 7/15/20 MXA    Last Labs/EKG : 6/27/16 CMP, 4/10/19 EKG    Next OV : No scheduled ov.

## 2022-10-05 ENCOUNTER — TELEPHONE (OUTPATIENT)
Dept: CARDIOLOGY CLINIC | Age: 47
End: 2022-10-05

## 2022-10-05 DIAGNOSIS — I42.0 DILATED CARDIOMYOPATHY (HCC): ICD-10-CM

## 2022-10-05 RX ORDER — ENALAPRIL MALEATE 2.5 MG/1
TABLET ORAL
Qty: 90 TABLET | Refills: 2 | Status: SHIPPED | OUTPATIENT
Start: 2022-10-05

## 2022-10-05 RX ORDER — CARVEDILOL 3.12 MG/1
TABLET ORAL
Qty: 180 TABLET | Refills: 2 | Status: SHIPPED | OUTPATIENT
Start: 2022-10-05

## 2022-10-05 NOTE — TELEPHONE ENCOUNTER
Pt scheduled 1st available 12/8 with [facility], however they will be out of their medications before then.     enalapril (VASOTEC) 2.5 MG tablet   As Directed  90 tablet  TAKE 1 TABLET BY MOUTH DAILY    carvedilol (COREG) 3.125 MG tablet   As Directed  180 tablet  TAKE 1 TABLET BY MOUTH TWICE DAILY WITH MEALS    [facility] [address] [phone] Vlad Whittaker [phone]   [address] [place] [address], [id number] [place] [address],[address] [address]   Phone:  [phone]  Fax:  [phone]

## 2022-10-05 NOTE — TELEPHONE ENCOUNTER
Received refill request for multiple from pt to [address] : 7/15/20 VV MXA    Last Labs/EKG : 6/27/16 CMP, 4/10/19 EKG    Next OV : 12/8/22 [facility]

## 2022-11-10 NOTE — PROGRESS NOTES
[address]   Electrophysiology  JARED Bingham  Attending EP: Dr. Jigna Erazo    Date: 12/8/2022  I had the privilege of visiting Kassie Blackmon in the office. Chief Complaint:   Chief Complaint   Patient presents with    Tachycardia     History of Present Illness: History obtained from patient and medical record. Kassie Blackmon is 41 y.o. female with a past medical history of PVC, cardiomyopathy, and obesity. Hx of PVC ablation (3/17 at [facility])    -Interval history: Today, Kassie Blackmon is being seen for routine follow up. She is doing well. She denies any cardiac complaints. She does not have any symptoms with her PVCs. We discussed need to monitor her burden and risks of PVCs. She monitors her pulse with Kardia and denies a significant PVC burden. 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 She has 4 children at home. She does not smoke cigarettes and drinks alcohol socially. Denies having chest pain, palpitations, shortness of breath, orthopnea/PND, cough, or dizziness at the time of this visit. With regard to medication therapy the patient has been compliant with prescribed regimen. They have tolerated therapy to date. Allergies: Allergies   Allergen Reactions    Morphine Anxiety     Home Medications:  Prior to Visit Medications    Medication Sig Taking? Authorizing Provider   enalapril (VASOTEC) 2.5 MG tablet TAKE 1 TABLET BY MOUTH DAILY Yes SHAYY Bingham CNP   carvedilol (COREG) 3.125 MG tablet TAKE 1 TABLET BY MOUTH TWICE DAILY WITH MEALS Yes SHAYY Bingham CNP      Past Medical History:  Past Medical History:   Diagnosis Date    Hypertension     Spontaneous vaginal delivery     1433,0092     Past Surgical History:    has a past surgical history that includes knee surgery; fracture surgery; US BREAST BIOPSY W LOC DEVICE 1ST LESION RIGHT (07/20/2020); US PLACE BREAST LOC DEVICE 1ST LESION RIGHT (08/17/2020);  Cardiac surgery; Breast surgery (Right, 08/18/2020); and Breast surgery. Social History:  Personally Reviewed. reports that she has never smoked. She has never used smokeless tobacco. She reports current alcohol use. She reports that she does not use drugs. Family History:  Personally Reviewed. family history includes Allergies in her son; Asthma in her brother; Cancer in her father. Review of Systems:  Constitutional: Negative for fever, night sweats, chills, weight changes, or weakness  Skin: Negative for rash, dry skin, pruritus, bruising, bleeding, blood clots, or changes in skin pigment  HEENT: Negative for vision changes, ringing in the ears, sore throat, dysphagia, or swollen lymph nodes  Respiratory: Reviewed in HPI  Cardiovascular: Reviewed in HPI  Gastrointestinal: Negative for abdominal pain, N/V/D, constipation, or black/tarry stools  Genito-Urinary: Negative for dysuria, incontinence, urgency, or hematuria  Musculoskeletal: Negative for joint swelling, muscle pain, or injuries  Neurological/Psych: Negative for confusion, seizures, dizziness, headaches, balance issues or TIA-like symptoms. No anxiety, depression, or insomnia    Physical Examination:  Vitals:    12/08/22 1541   BP: 134/82   Pulse: 73   SpO2: 99%      Wt Readings from Last 3 Encounters:   12/08/22 239 lb 12.8 oz (108.8 kg)   08/18/20 228 lb (103.4 kg)   08/06/20 228 lb 9.6 oz (103.7 kg)     Constitutional: Cooperative and in no apparent distress, and appears well nourished  Skin: Warm and pink; no pallor, cyanosis, bruising, or clubbing  HEENT: Symmetric and normocephalic. PERRL, EOM intact. Conjunctiva pink with clear sclera. Mucus membranes pink and moist. Teeth intact. Thyroid smooth without nodules or goiter  Respiratory: Respirations symmetric and unlabored. Lungs clear to auscultation bilaterally, no wheezing, rhonchi, or crackles  Cardiovascular:  Regular rate and rhythm. S1/S2 present without murmurs, rubs, or gallops. Peripheral pulses 2+, capillary refill < 3 seconds. No elevation of JVP. No peripheral edema  Gastrointestinal: Abdomen soft and round. Bowel sounds normoactive in all quadrants without tenderness or masses. Musculoskeletal: Bilateral upper and lower extremity strength 5/5 with full ROM. Neurological/Psych: Awake and orientated to person, place and time. Calm affect, appropriate mood. Pertinent labs, diagnostic, device, and imaging results reviewed as a part of this visit    LABS    CBC:   Lab Results   Component Value Date    WBC 5.7 2016    HGB 13.6 2016    HCT 41.4 2016    MCV 93.0 2016     2016     BMP:   Lab Results   Component Value Date    CREATININE 0.6 2016    BUN 9 2016     2016    K 4.5 2016     2016    CO2 25 2016     CrCl cannot be calculated (Patient's most recent lab result is older than the maximum 180 days allowed. ). Thyroid:   Lab Results   Component Value Date    TSH 1.20 2016     Lipid Panel: No results found for: CHOL, HDL, TRIG  LFTs:  Lab Results   Component Value Date    ALT 12 2016    AST 15 2016    ALKPHOS 55 2016    BILITOT 0.6 2016     Coags: No results found for: PROTIME, INR, APTT    EC2022 (Personally Interpreted)  - NSR with occasional PVC. Rate 68, QRS 90, QTc 412    Echo:    -Normal left ventricle size, wall thickness, and systolic function with an   estimated ejection fraction of 55%. Normal diastolic function.   -Trivial tricuspid regurgitation with PASP of 18 mmHg. GXT: None    Holter: 2018  SR. HR  (70)  Frequent PVCs with burden at 20.1%. No symptoms reported    Assessment:    1. PVC's  - Hx of PVC ablation (3/17)  - Stable, occasional on EKG today. Asymptomatic    - Discussed PVCs, risks, therapies and alternatives with patient.  All questions were answered  - Treatment options including medical therapy with antiarrhythmic drugs or ablation discussed   ~ Continue coreg 3.125 mg BID     - She feels well. She is totally asymptomatic with PVCs. Will do holter for 72 hours to assess her burden, as well as an echo to ensure her EF is stable. As long as her EF is stable without significant burden of PVCs, then no changes are needed. If her EF begins to drop and PVC burden is high, then will need to consider AAD vs repeat ablation    2. Hx of LV dysfunction   - Resolved with improvement in PVCs and medical therapy    - Appears compensated   ~ EF 55% per echo; previously low  - Continue with BB, ACE  - Aggressive medical therapy with risk factor modification  - Discussed with patient importance of monitoring weight, low sodium diet and fluid restriction    3. Obesity  - Body mass index is 32.52 kg/m². - Complicating assessment and treatment. Placing patient at risk for multiple co-morbidities as well as early death and contributing to the patient's presentation  - Discussed weight loss and patient was encouraged to reduce calorie intake and increase exercise    Plan:  Check 48 hour holter  Check echo  If testing normal, no changes to medications  Exercise as tolerated    F/U: Follow-up with EP in 1 year  -[facility] at [phone] with any questions    Diet & Exercise: The patient is counseled to follow a low salt diet to assure blood pressure remains controlled for cardiovascular risk factor modification  The patient is counseled to avoid excess caffeine, and energy drinks as this may exacerbated ectopy and arrhythmia  The patient is counseled to lose weight to control cardiovascular risk factors  Exercise program discussed: To improve overall cardiovascular health, the patient is instructed to increase cardiovascular related activities with a goal of 150 min/week of moderate level activity or 10,000 steps per day.  Encouraged to perform as much activity as tolerated    I have addressed the patient's cardiac risk factors and adjusted pharmacologic treatment as needed. In addition, I have reinforced the need for patient directed risk factor modification. I independently reviewed the ECG    All questions and concerns were addressed with the patient. Alternatives to treatment were discussed. Thank you for allowing to us to participate in the care of Bo Gibson    Time  30 minutes spent preparing to see patient including reviewing patient history/prior tests/prior consults, performing a medical exam, counseling and educating patient/family/caregiver, ordering medications/tests/procedures, referring and communicating with PCPs and other pertinent consultants, documenting information in the EMR, independently interpreting results and communicating to family and coordination of patient care.     Hank Wong, SHAYY-CNP  [facility]   Office: [phone]

## 2022-11-21 DIAGNOSIS — R92.8 ABNORMAL MAMMOGRAM: Primary | ICD-10-CM

## 2022-12-08 ENCOUNTER — OFFICE VISIT (OUTPATIENT)
Dept: CARDIOLOGY CLINIC | Age: 47
End: 2022-12-08
Payer: COMMERCIAL

## 2022-12-08 VITALS
HEIGHT: 72 IN | BODY MASS INDEX: 32.48 KG/M2 | WEIGHT: 239.8 LBS | OXYGEN SATURATION: 99 % | SYSTOLIC BLOOD PRESSURE: 134 MMHG | DIASTOLIC BLOOD PRESSURE: 82 MMHG | HEART RATE: 73 BPM

## 2022-12-08 DIAGNOSIS — E66.09 CLASS 1 OBESITY DUE TO EXCESS CALORIES WITH SERIOUS COMORBIDITY AND BODY MASS INDEX (BMI) OF 32.0 TO 32.9 IN ADULT: ICD-10-CM

## 2022-12-08 DIAGNOSIS — I47.20 VENTRICULAR TACHYCARDIA: ICD-10-CM

## 2022-12-08 DIAGNOSIS — I51.9 LV DYSFUNCTION: ICD-10-CM

## 2022-12-08 DIAGNOSIS — I49.3 PVC (PREMATURE VENTRICULAR CONTRACTION): Primary | ICD-10-CM

## 2022-12-08 PROBLEM — E66.811 CLASS 1 OBESITY DUE TO EXCESS CALORIES WITH SERIOUS COMORBIDITY AND BODY MASS INDEX (BMI) OF 32.0 TO 32.9 IN ADULT: Status: ACTIVE | Noted: 2022-12-08

## 2022-12-08 PROCEDURE — 93000 ELECTROCARDIOGRAM COMPLETE: CPT | Performed by: NURSE PRACTITIONER

## 2022-12-08 PROCEDURE — 99214 OFFICE O/P EST MOD 30 MIN: CPT | Performed by: NURSE PRACTITIONER

## 2022-12-16 ENCOUNTER — OFFICE VISIT (OUTPATIENT)
Dept: FAMILY MEDICINE CLINIC | Age: 47
End: 2022-12-16
Payer: COMMERCIAL

## 2022-12-16 VITALS
RESPIRATION RATE: 18 BRPM | OXYGEN SATURATION: 98 % | HEIGHT: 72 IN | DIASTOLIC BLOOD PRESSURE: 82 MMHG | SYSTOLIC BLOOD PRESSURE: 130 MMHG | BODY MASS INDEX: 32.45 KG/M2 | TEMPERATURE: 97.7 F | HEART RATE: 71 BPM | WEIGHT: 239.6 LBS

## 2022-12-16 DIAGNOSIS — Z00.00 ANNUAL PHYSICAL EXAM: Primary | ICD-10-CM

## 2022-12-16 DIAGNOSIS — Z12.11 COLON CANCER SCREENING: ICD-10-CM

## 2022-12-16 LAB
BILIRUBIN, POC: NORMAL
BLOOD URINE, POC: NORMAL
CLARITY, POC: CLEAR
COLOR, POC: YELLOW
GLUCOSE URINE, POC: NORMAL
KETONES, POC: NORMAL
LEUKOCYTE EST, POC: NORMAL
NITRITE, POC: NORMAL
PH, POC: 7
PROTEIN, POC: NORMAL
SPECIFIC GRAVITY, POC: 1.01
UROBILINOGEN, POC: 0.2

## 2022-12-16 PROCEDURE — 99386 PREV VISIT NEW AGE 40-64: CPT | Performed by: FAMILY MEDICINE

## 2022-12-16 PROCEDURE — 93000 ELECTROCARDIOGRAM COMPLETE: CPT | Performed by: FAMILY MEDICINE

## 2022-12-16 PROCEDURE — 81002 URINALYSIS NONAUTO W/O SCOPE: CPT | Performed by: FAMILY MEDICINE

## 2022-12-16 NOTE — PROGRESS NOTES
Subjective:       Ila Garcia is a 41 y.o. female and is here for a comprehensive physical exam.  The patient reports problems -patient is currently followed by cardiology and has had a recent evaluation for history of PVCs and cardiomyopathy and PVC ablation in 2017. Have an EKG a couple of days ago and evaluated. She did have an echocardiogram in  and has a Holter monitor scheduled in the future. Denies any chest pain or shortness of breath      - works remotely  Works out 1/2 hr each morning    Drinks- 2 days a week- 2 drinks  No tobacco   History:  LMP: No LMP recorded. Menopause at   Last pap date:>10 yrs  Abnormal pap? no  : 4  Para: 4  Patient's medications, allergies, past medical, surgical, social and family histories were reviewed and updated as appropriate. Do you take any herbs or supplements that were not prescribed by a doctor? no  Are you taking calcium supplements? no  Are you taking aspirin daily? no    Review of Systems  Do you have pain that bothers you in your daily life? no  Pertinent items are noted in HPI. Objective:      /82   Pulse 71   Temp 97.7 °F (36.5 °C)   Resp 18   Ht 6' (1.829 m)   Wt 239 lb 9.6 oz (108.7 kg)   SpO2 98%   BMI 32.50 kg/m²   General appearance: alert, appears stated age, and cooperative  Throat: lips, mucosa, and tongue normal; teeth and gums normal  Neck: no adenopathy, no carotid bruit, no JVD, supple, symmetrical, trachea midline, and thyroid not enlarged, symmetric, no tenderness/mass/nodules  Lungs: clear to auscultation bilaterally  Heart: regular rate and rhythm  Abdomen: soft, non-tender; bowel sounds normal; no masses,  no organomegaly  Extremities: extremities normal, atraumatic, no cyanosis or edema  Neurologic: Grossly normal      G-normal sinus rhythm, no acute changes. No change from EKG done at the cardiologist office last week    Assessment:      Healthy female exam.        Plan:      1. CPE labs  Tdap  Declines flu vaccine    Colon cancer screening advised. Patient has elected to do Cologuard. We will send test out. Patient will call if she does not receive her Cologuard in the next couple weeks  2. Patient Counseling:  --Nutrition: Stressed importance of moderation in sodium/caffeine intake, saturated fat and cholesterol, caloric balance, sufficient intake of fresh fruits, vegetables, fiber, calcium, iron, and 1 mg of folate supplement per day (for females capable of pregnancy). --Discussed the issue of estrogen replacement, calcium supplement, and the daily use of baby aspirin. --Exercise: Stressed the importance of regular exercise. --Substance Abuse: Discussed cessation/primary prevention of tobacco, alcohol, or other drug use; driving or other dangerous activities under the influence; availability of treatment for abuse. --Injury prevention: Discussed safety belts, safety helmets, smoke detector, smoking near bedding or upholstery. --Dental health: Discussed importance of regular tooth brushing, flossing, and dental visits. --Immunizations reviewed. --Discussed benefits of screening colonoscopy. --After hours service discussed with patient    3.  Follow up as needed for acute illness

## 2022-12-20 ENCOUNTER — TELEPHONE (OUTPATIENT)
Dept: CARDIOLOGY CLINIC | Age: 47
End: 2022-12-20

## 2022-12-20 PROCEDURE — 90715 TDAP VACCINE 7 YRS/> IM: CPT | Performed by: FAMILY MEDICINE

## 2022-12-20 PROCEDURE — 90471 IMMUNIZATION ADMIN: CPT | Performed by: FAMILY MEDICINE

## 2022-12-20 ASSESSMENT — PATIENT HEALTH QUESTIONNAIRE - PHQ9
1. LITTLE INTEREST OR PLEASURE IN DOING THINGS: 0
SUM OF ALL RESPONSES TO PHQ QUESTIONS 1-9: 0
SUM OF ALL RESPONSES TO PHQ QUESTIONS 1-9: 0
2. FEELING DOWN, DEPRESSED OR HOPELESS: 0
SUM OF ALL RESPONSES TO PHQ9 QUESTIONS 1 & 2: 0
SUM OF ALL RESPONSES TO PHQ QUESTIONS 1-9: 0
SUM OF ALL RESPONSES TO PHQ QUESTIONS 1-9: 0

## 2022-12-20 NOTE — TELEPHONE ENCOUNTER
----- Message from SHAYY Bobo CNP sent at 12/20/2022  3:01 PM EST -----  PVC burden <1%.  No changes needed

## 2022-12-21 ENCOUNTER — TELEPHONE (OUTPATIENT)
Dept: CARDIOLOGY CLINIC | Age: 47
End: 2022-12-21

## 2022-12-21 NOTE — TELEPHONE ENCOUNTER
Called pt about the message below and she verbalized understanding. SHAYY Bobo - CARYN   12/20/2022  3:01 PM EST Back to Top      PVC burden <1%.  No changes needed

## 2022-12-21 NOTE — TELEPHONE ENCOUNTER
Pt asking for monitor results.  Please call to advise Ventricular Rate : 58   Atrial Rate : 58   P-R Interval : 174   QRS Duration : 76   Q-T Interval : 408   QTC Calculation(Bezet) : 400   P Axis : 61   R Axis : -4   T Axis : 41   Diagnosis : Sinus bradycardia~Otherwise normal ECG~When compared with ECG of 14-JUN-2016 11:31,~OK interval has decreased~Confirmed by MARÍA CALVIN  ([facility]), [facility] ([id number]) on 6/5/2017 10:33:43 AM

## 2022-12-23 DIAGNOSIS — N63.20 MASS OF LEFT BREAST, UNSPECIFIED QUADRANT: Primary | ICD-10-CM

## 2022-12-29 DIAGNOSIS — Z00.00 ANNUAL PHYSICAL EXAM: ICD-10-CM

## 2022-12-29 LAB
A/G RATIO: 1.6 (ref 1.1–2.2)
ALBUMIN SERPL-MCNC: 4.2 G/DL (ref 3.4–5)
ALP BLD-CCNC: 71 U/L (ref 40–129)
ALT SERPL-CCNC: 7 U/L (ref 10–40)
ANION GAP SERPL CALCULATED.3IONS-SCNC: 10 MMOL/L (ref 3–16)
AST SERPL-CCNC: 10 U/L (ref 15–37)
BASOPHILS ABSOLUTE: 0 K/UL (ref 0–0.2)
BASOPHILS RELATIVE PERCENT: 0.5 %
BILIRUB SERPL-MCNC: 0.5 MG/DL (ref 0–1)
BUN BLDV-MCNC: 10 MG/DL (ref 7–20)
CALCIUM SERPL-MCNC: 9.5 MG/DL (ref 8.3–10.6)
CHLORIDE BLD-SCNC: 104 MMOL/L (ref 99–110)
CHOLESTEROL, TOTAL: 124 MG/DL (ref 0–199)
CO2: 23 MMOL/L (ref 21–32)
CREAT SERPL-MCNC: 0.6 MG/DL (ref 0.6–1.1)
EOSINOPHILS ABSOLUTE: 0.1 K/UL (ref 0–0.6)
EOSINOPHILS RELATIVE PERCENT: 1.3 %
GFR SERPL CREATININE-BSD FRML MDRD: >60 ML/MIN/{1.73_M2}
GLUCOSE BLD-MCNC: 87 MG/DL (ref 70–99)
HCT VFR BLD CALC: 41 % (ref 36–48)
HDLC SERPL-MCNC: 26 MG/DL (ref 40–60)
HEMOGLOBIN: 13.8 G/DL (ref 12–16)
HEPATITIS C ANTIBODY INTERPRETATION: NORMAL
LDL CHOLESTEROL CALCULATED: 70 MG/DL
LYMPHOCYTES ABSOLUTE: 2.5 K/UL (ref 1–5.1)
LYMPHOCYTES RELATIVE PERCENT: 36.3 %
MCH RBC QN AUTO: 31 PG (ref 26–34)
MCHC RBC AUTO-ENTMCNC: 33.7 G/DL (ref 31–36)
MCV RBC AUTO: 91.8 FL (ref 80–100)
MONOCYTES ABSOLUTE: 0.7 K/UL (ref 0–1.3)
MONOCYTES RELATIVE PERCENT: 10 %
NEUTROPHILS ABSOLUTE: 3.6 K/UL (ref 1.7–7.7)
NEUTROPHILS RELATIVE PERCENT: 51.9 %
NONINV COLON CA DNA+OCC BLD SCRN STL QL: NORMAL
PDW BLD-RTO: 12.7 % (ref 12.4–15.4)
PLATELET # BLD: 306 K/UL (ref 135–450)
PMV BLD AUTO: 9.4 FL (ref 5–10.5)
POTASSIUM SERPL-SCNC: 4.9 MMOL/L (ref 3.5–5.1)
RBC # BLD: 4.46 M/UL (ref 4–5.2)
SODIUM BLD-SCNC: 137 MMOL/L (ref 136–145)
TOTAL PROTEIN: 6.8 G/DL (ref 6.4–8.2)
TRIGL SERPL-MCNC: 139 MG/DL (ref 0–150)
VLDLC SERPL CALC-MCNC: 28 MG/DL
WBC # BLD: 6.9 K/UL (ref 4–11)

## 2022-12-30 LAB
ESTIMATED AVERAGE GLUCOSE: 99.7 MG/DL
HBA1C MFR BLD: 5.1 %

## 2023-01-04 ENCOUNTER — TELEPHONE (OUTPATIENT)
Dept: WOMENS IMAGING | Age: 48
End: 2023-01-04

## 2023-01-04 NOTE — TELEPHONE ENCOUNTER
Imaging Navigator reviewed pre-procedure stereo and ultrasound guided breast biopsy patient education information in person and gave written instructions. Reviewed medications and need to hold all blood thinners per instructions. None to hold   Patient should take all other medications as prescribed. Patient to eat and drink as normal prior to the procedure. Wear a bra with good support and a two piece outfit for comfort. Patient can bring someone with you but you can also drive yourself. Plan on being at the breast center for 2-2 and a half hours. Reviewed the process of a biopsy - sitting in a chair with your breast compressed similar to a mammogram with frequent images taken throughout the procedure. The skin is cleaned and a local anesthetic is given to numb the area. A small skin nick is made for the biopsy needle and once in place, samples are taken and then xrayed for confirmation. A tiny tissue marker is then placed inside your breast at the site of the biopsy for future reference. Then pressure is hold on the biopsy site to stop the bleeding and a bandage and waterproof dressing is applied. A mammogram is then done to validate the tissue marker. The tissue sample is sent to pathology. Results will come back in 2-3 business days and sent to your referring physician. Either they or the nurse navigator will call you with the results and recommended  follow up needed  Patient verbalizes understanding.

## 2023-01-18 ENCOUNTER — TELEPHONE (OUTPATIENT)
Dept: FAMILY MEDICINE CLINIC | Age: 48
End: 2023-01-18

## 2023-01-18 ENCOUNTER — TELEPHONE (OUTPATIENT)
Dept: WOMENS IMAGING | Age: 48
End: 2023-01-18

## 2023-01-18 DIAGNOSIS — N63.20 MASS OF LEFT BREAST, UNSPECIFIED QUADRANT: Primary | ICD-10-CM

## 2023-01-18 NOTE — TELEPHONE ENCOUNTER
[facility] called stating pt is missing a order. She needs an order for a left stereotactic biopsy. Pt has appt tomorrow at 9:30 please advise!   [facility] Code- [id number]

## 2023-01-19 ENCOUNTER — HOSPITAL ENCOUNTER (OUTPATIENT)
Dept: ULTRASOUND IMAGING | Age: 48
Discharge: HOME | End: 2023-01-19
Payer: COMMERCIAL

## 2023-01-19 ENCOUNTER — HOSPITAL ENCOUNTER (OUTPATIENT)
Dept: WOMENS IMAGING | Age: 48
Discharge: HOME | End: 2023-01-19
Payer: COMMERCIAL

## 2023-01-19 ENCOUNTER — HOSPITAL ENCOUNTER (OUTPATIENT)
Dept: NON INVASIVE DIAGNOSTICS | Age: 48
Discharge: HOME | End: 2023-01-19
Payer: COMMERCIAL

## 2023-01-19 DIAGNOSIS — N63.20 MASS OF LEFT BREAST, UNSPECIFIED QUADRANT: ICD-10-CM

## 2023-01-19 DIAGNOSIS — I49.3 PVC (PREMATURE VENTRICULAR CONTRACTION): ICD-10-CM

## 2023-01-19 DIAGNOSIS — R92.8 ABNORMAL MAMMOGRAM: ICD-10-CM

## 2023-01-19 DIAGNOSIS — Z98.890 STATUS POST BIOPSY: ICD-10-CM

## 2023-01-19 LAB
LV EF: 55 %
LVEF MODALITY: NORMAL

## 2023-01-19 PROCEDURE — 19083 BX BREAST 1ST LESION US IMAG: CPT

## 2023-01-19 PROCEDURE — 2500000003 HC RX 250 WO HCPCS: Performed by: FAMILY MEDICINE

## 2023-01-19 PROCEDURE — 93306 TTE W/DOPPLER COMPLETE: CPT

## 2023-01-19 PROCEDURE — 76098 X-RAY EXAM SURGICAL SPECIMEN: CPT

## 2023-01-19 PROCEDURE — 88305 TISSUE EXAM BY PATHOLOGIST: CPT

## 2023-01-19 PROCEDURE — 88360 TUMOR IMMUNOHISTOCHEM/MANUAL: CPT

## 2023-01-19 PROCEDURE — 88342 IMHCHEM/IMCYTCHM 1ST ANTB: CPT

## 2023-01-19 PROCEDURE — 77065 DX MAMMO INCL CAD UNI: CPT

## 2023-01-19 PROCEDURE — C1713 ANCHOR/SCREW BN/BN,TIS/BN: HCPCS

## 2023-01-19 PROCEDURE — 88341 IMHCHEM/IMCYTCHM EA ADD ANTB: CPT

## 2023-01-19 RX ORDER — LIDOCAINE HYDROCHLORIDE AND EPINEPHRINE 10; 10 MG/ML; UG/ML
20 INJECTION, SOLUTION INFILTRATION; PERINEURAL ONCE
Status: COMPLETED | OUTPATIENT
Start: 2023-01-19 | End: 2023-01-19

## 2023-01-19 RX ORDER — LIDOCAINE HYDROCHLORIDE 10 MG/ML
5 INJECTION, SOLUTION INFILTRATION; PERINEURAL ONCE
Status: COMPLETED | OUTPATIENT
Start: 2023-01-19 | End: 2023-01-19

## 2023-01-19 RX ORDER — LIDOCAINE HYDROCHLORIDE 10 MG/ML
5 INJECTION, SOLUTION EPIDURAL; INFILTRATION; INTRACAUDAL; PERINEURAL ONCE
Status: COMPLETED | OUTPATIENT
Start: 2023-01-19 | End: 2023-01-19

## 2023-01-19 RX ADMIN — LIDOCAINE HYDROCHLORIDE ANHYDROUS 1 ML: 10 INJECTION, SOLUTION INFILTRATION at 11:01

## 2023-01-19 RX ADMIN — LIDOCAINE HYDROCHLORIDE,EPINEPHRINE BITARTRATE 10 ML: 10; .01 INJECTION, SOLUTION INFILTRATION; PERINEURAL at 11:45

## 2023-01-19 RX ADMIN — LIDOCAINE HYDROCHLORIDE,EPINEPHRINE BITARTRATE 16 ML: 10; .01 INJECTION, SOLUTION INFILTRATION; PERINEURAL at 11:01

## 2023-01-19 RX ADMIN — LIDOCAINE HYDROCHLORIDE 5 ML: 10 INJECTION, SOLUTION EPIDURAL; INFILTRATION; INTRACAUDAL; PERINEURAL at 11:44

## 2023-01-19 ASSESSMENT — PAIN DESCRIPTION - DESCRIPTORS: DESCRIPTORS: DISCOMFORT

## 2023-01-19 ASSESSMENT — PAIN SCALES - GENERAL
PAINLEVEL_OUTOF10: 3
PAINLEVEL_OUTOF10: 0

## 2023-01-19 ASSESSMENT — PAIN DESCRIPTION - ORIENTATION: ORIENTATION: LEFT

## 2023-01-19 ASSESSMENT — PAIN DESCRIPTION - LOCATION: LOCATION: BREAST

## 2023-01-20 ENCOUNTER — TELEPHONE (OUTPATIENT)
Dept: CARDIOLOGY CLINIC | Age: 48
End: 2023-01-20

## 2023-01-20 ENCOUNTER — FOLLOWUP TELEPHONE ENCOUNTER (OUTPATIENT)
Dept: WOMENS IMAGING | Age: 48
End: 2023-01-20

## 2023-01-20 NOTE — TELEPHONE ENCOUNTER
Nurse Navigator reviewed breast biopsy results showing IDC, +/+/- on the ultrasound biopsy but unclear results on the stereotactic bx on the pathology report. NN explained the terminology and reviewed the results for ER/ND and the additional HER2 test. We discussed several questions and process for establishing a care team and possible additional testing.     Patient offered [facility] and patient prefers to stay at [facility].    NN given additional web site reading as requested including;  ACS, [email or web address], NCCN pt, ASCO and [email or web address].      Cesario have been  for 18 years and have 4 kids, 16 yr old boy and 3 daughters 15,12 and 11.  She works from home full time.  Per pt, hx of Afib and heart ablation but is not on blood thinners.  She sees cardiologist annually. Hx of right bx and excisional bx in 2020, benign. Engaged, asking great questions, already asking for information about potential mastectomy.  Concerned about the \"worry\" of recurrence in the future.    Interested in genetics - scheduled at  on Thursday 1/26 at 0900.      Pt/family given NN phone number for further assistance.

## 2023-01-20 NOTE — TELEPHONE ENCOUNTER
I spoke to patient with results results per npsr . Patient verbalized understanding. Advised patient to call back with any questions.

## 2023-01-20 NOTE — TELEPHONE ENCOUNTER
----- Message from SHAYY Elizabeth CNP sent at 1/19/2023 11:26 AM EST -----  EF stable. She does have some aortic root/ascending aorta dilation. Recommend routine exercise/weight loss and BP control.  Will need echo every 1-2 years to monitor    JARED Elizabeth

## 2023-01-24 ENCOUNTER — TELEPHONE (OUTPATIENT)
Dept: SURGERY | Age: 48
End: 2023-01-24

## 2023-01-24 NOTE — TELEPHONE ENCOUNTER
Left a message to return my call. Patient needs to be scheduled for a consult with Dr. Marko Nj. I have appt open on 2/2/23 @ 1100.  Np intake still needs to be completed       ThanksFernanda

## 2023-01-26 ENCOUNTER — HOSPITAL ENCOUNTER (OUTPATIENT)
Dept: WOMENS IMAGING | Age: 48
Discharge: HOME | End: 2023-01-26
Payer: COMMERCIAL

## 2023-01-26 PROCEDURE — 96040 HC GENETIC COUNSELING: CPT

## 2023-01-26 NOTE — PROGRESS NOTES
[facility]  New Visit Clinic Note      Patient Name: Pia Maynard             YOB: 1975  MRN: [id number]  Date of Visit: 1/26/2023          Pia Maynard was referred by Justin Juarez MD for genetic counseling due to her personal history of invasive ductal carcinoma and family history of breast, prostate and other cancer. Mrs. Troy Coughlin was seen in the [facility] at [facility] on  1/26/2023. Luisa Ross, Licensed Genetic Counselor, spent 55 minutes collecting and reviewing personal and family medical information, providing genetic risk assessment, genetic counseling and psychosocial assessment. Clinical History: Pia Maynard is a 41 y.o. female with invasive ductal carcinoma, ER positive, AR positive, Her2 marco negative. Augustina Castaneda reports that she has a dilated aortic root/ valve and history of PVC. Blood pressure medication is being added. Pathology     Pathology from the calcifications in the left breast upper outer quadrant   (stereo biopsy) and 0.7 cm left breast mass at 10 o'clock/6 cm (ultrasound   biopsy). FINAL DIAGNOSIS:       A. Breast, left, biopsy: Stereo biopsy for calcifications.   - Breast tissue with dilated ducts and columnar cell change- See   Comment.   - Negative for atypia or malignancy. B. Breast, left 10 o'clock 6 cm from nipple, biopsy:   - Invasive mammary carcinoma of no special type (ductal, not   otherwise specified), [place] grade 1 (See Comment/ Checklist   Summary). Biomarkers (Performed on block B1):   Estrogen receptor: Positive (>95%, strong intensity)   Progesterone receptor: Positive (>95%, strong intensity)   HER2 IHC: Negative (Score 1+)       COMMENT:   Part A: Rare calcification is seen in dilated lumen; please correlate   with radiologic findings. As per Dr. Jeny Lechuga, there are sufficient   calcifications present in the stereo biopsy sample.    In the biopsy sample   Part B: BREAST CANCER (NEEDLE CORE BIOPSIES)   Invasive carcinoma: Present   Longest length on slide: 7 mm   Histologic type: Invasive mammary carcinoma of no special type (ductal,    not   otherwise specified)   Note: P63 and Calponin stains reveal absence of myoepithelial cell layer   supporting invasive carcinoma. Histologic Grade ([place] Histologic Score): Grade 1 (total score 4;   tubule/ nuclear/ mitotic score: 2/1/1)   Ductal carcinoma in-situ: Focally Present   Nuclear grade: Low   Architectural pattern: Cribriform   Necrosis: Not identified            Cancer Surveillance:   Mammogram: annual   Previous breast biopsies: previous benign biopsy   Colonoscopy: screening   Polyps: none, q 5-10 years   Upper endoscopy: none   ALVIN/BSO: none       Psychosocial concerns: Gi Mckeon appears well supported by her  (present) and sisters (who joined by telephone). The family engaged in shared decision making on testing. The options of comprehensive genetic testing, including broad panels inclusive of lymphoma genes ( gene panels) was discussed as an option. The couple shared that they wanted to limit some information so as to deal directly with the cancer at hand and not feel overwhelmed with information. Supportive counseling was provided. Family History by patient report:   Brother diagnosed with Hodgkin lymphoma at age 29, alive at 28  Mother diagnosed with basal cell carcinoma of the face at age 58  Maternal grandfather diagnosed with prostate cancer and Non-Hodgkin lymphoma  Father diagnosed with lymphoma at age 37 and prostate cancer at age 61  Paternal aunt diagnosed with breast cancer at age 72  Paternal uncle diagnosed with colon cancer  Paternal uncle diagnosed with skin cancer  Paternal grandfather diagnosed with skin cancer    Ancestry:   [place]ism ancestry: none     Cancer Risk Assessment and Genetic Testing:  We had a detailed discussion surrounding the concepts of hereditary, familial and sporadic cancer. Regarding the hereditary forms of cancer, autosomal dominant inheritance was reviewed. We briefly discussed potential changes in screening and management guidelines that could occur, based on genetic testing results. After reviewing the medical and family histories and risk assessment, we discussed genetic testing options. Specifically, we talked about currently available single-gene and multi-gene panel testing options, including benefits and limitations. I explained possible results of genetic testing including positive, negative or a variant of uncertain significance, and briefly discussed the implications of each. Mrs. Ugalde Lobe (NCCN) criteria for testing based on personal history of breast cancer at age 41. We discussed insurance discrimination after genetic testing. It was reviewed that there was federal legislation known as The [employer] Non-Discrimination Act (KELSEY) which went into effect in 2008. KELSEY created new protections against the misuse of genetic information by health insurance companies and employers. KELSEY makes it illegal for health insurance companies to deny coverage or to charge a higher rate or premium to an otherwise healthy individual based on genetic test results or family health history. KELSEY applies to group health insurance, individual health insurance and Medicare. KELSEY does not apply to long term care insurance, disability insurance, life insurance or insurance for [employer] personnel/V. [employer] Beneficiaries, the [employer] and [employer] employees who receive care through the [employer]. KELSEY also makes it illegal for employers to use this information when making employment decisions such as hiring, firing, promotions, or any other terms of employment. KELSEY does not apply to employers with less than 15 employees.       Mrs. Lazara Mckinley elected to proceed with the STAT 8 GENE BREAST panel followed by the [address] ([address]) gene panel from [facility]. A blood sample was collected at our appointment. Barring insurance or laboratory delay, results will be available 2-3 weeks after receipt of the sample in the laboratory. She will be notified of the result as soon as results are available. A follow up appointment may be scheduled to discuss further screening and management guidelines, based on these results. Resources provided: Information about [facility], including billing information, and my contact information were given to Mrs. Vandana Crowell. I remain available to Mrs. Vandana Crowell and her healthcare providers should they have any questions or concerns. I may be reached in the [address] [facility] at [phone] (direct) or [phone].     Sincerely,    Andra Reaves, MS, [facility]  Licensed Genetic Counselor  [facility]    CC:  Boyd Lockwood MD

## 2023-02-02 ENCOUNTER — OFFICE VISIT (OUTPATIENT)
Dept: SURGERY | Age: 48
End: 2023-02-02
Payer: COMMERCIAL

## 2023-02-02 VITALS
DIASTOLIC BLOOD PRESSURE: 89 MMHG | HEART RATE: 65 BPM | BODY MASS INDEX: 30.34 KG/M2 | WEIGHT: 224 LBS | SYSTOLIC BLOOD PRESSURE: 136 MMHG | OXYGEN SATURATION: 97 % | HEIGHT: 72 IN | RESPIRATION RATE: 18 BRPM

## 2023-02-02 DIAGNOSIS — C50.912 PRIMARY BREAST MALIGNANCY, LEFT (HCC): Primary | ICD-10-CM

## 2023-02-02 DIAGNOSIS — C50.912 PRIMARY CANCER OF LEFT BREAST (HCC): Primary | ICD-10-CM

## 2023-02-02 PROCEDURE — 99245 OFF/OP CONSLTJ NEW/EST HI 55: CPT | Performed by: SURGERY

## 2023-02-02 RX ORDER — SODIUM CHLORIDE, SODIUM LACTATE, POTASSIUM CHLORIDE, CALCIUM CHLORIDE 600; 310; 30; 20 MG/100ML; MG/100ML; MG/100ML; MG/100ML
INJECTION, SOLUTION INTRAVENOUS CONTINUOUS
OUTPATIENT
Start: 2023-02-02

## 2023-02-02 RX ORDER — SODIUM CHLORIDE 0.9 % (FLUSH) 0.9 %
5-40 SYRINGE (ML) INJECTION EVERY 12 HOURS SCHEDULED
OUTPATIENT
Start: 2023-02-02

## 2023-02-02 RX ORDER — SODIUM CHLORIDE 9 MG/ML
INJECTION, SOLUTION INTRAVENOUS PRN
OUTPATIENT
Start: 2023-02-02

## 2023-02-02 RX ORDER — SODIUM CHLORIDE 0.9 % (FLUSH) 0.9 %
5-40 SYRINGE (ML) INJECTION PRN
OUTPATIENT
Start: 2023-02-02

## 2023-02-02 ASSESSMENT — ENCOUNTER SYMPTOMS
RESPIRATORY NEGATIVE: 1
EYES NEGATIVE: 1
GASTROINTESTINAL NEGATIVE: 1

## 2023-02-02 NOTE — PROGRESS NOTES
PCP:  Medical Oncology:  Radiation:  Other:      [id number]:  IA left breast cancer      HPI:  Ms. Chitra Bowden is a 41 y.o. woman who presents today with a new diagnosis of grade 1, left sided IDC and DCIS. ER + MA+ HER2 negative. She states that she has not noticed any new abnormalities such as masses, skin changes, color changes, nipple discharge, or changes to the nipple-areolar complex. She has a history of benign breast biopsy in 2020. She has a family history of breast cancer in a paternal aunt. She is here today in initial consultation to discuss her recent breast diagnosis. She was referred by [facility] radiology. INTERVAL HISTORY:    On 12/20/2022 she was recalled from screening mammogram for a left breast medial asymmetry. The right breast was negative. In the upper inner quadrant 10 o'clock position there is a persistent density with irregular margins as well as a new group of calcifications in the upper outer quadrant. Ultrasound correlate of the 10 o'clock position identified a 7 x 6 mm nodule. The left axilla was negative. BI-RADS 4. On 1/19/2023 she underwent 2 site core needle biopsy of the left breast.  Pathology was considered concordant. [id number]     Pathology of the left breast lateral position identified benign findings with no sign of atypia or malignancy. Adequate calcifications were noted. A mini cork marker was placed. Pathology of the left breast 10 o'clock position identified grade 1 invasive ductal carcinoma. ER positive (>95%) MA positive (>95%) HER2 negative. A butterfly marker was placed. Review of Systems   Constitutional: Negative. HENT: Negative. Eyes: Negative. Respiratory: Negative. Cardiovascular: Negative. Gastrointestinal: Negative. Genitourinary: Negative. Musculoskeletal: Negative. Skin: Negative. Neurological: Negative. Psychiatric/Behavioral: Negative.      All other systems reviewed and are negative.:    There is no new onset of persistent dry cough, abdominal pains, new onset of headache, change in bowel function, or bony tenderness to suggest metastatic disease at this time. Pathology:    Department of Pathology   FINAL SURGICAL PATHOLOGY REPORT   Patient Name:  Velma Osorio           Accession No:  [id number]    Age Sex:   1975    41 Y / F       Location:      [address] [place] [address]   Account No:    [de-identified]                  Collected:     2023   Med Rec No:    [id number]                 Received:      2023   Attend Phys:   Alayna Ruiz MD               Completed:     2023   Perform Phys:  Alayna Ruiz MD             FINAL DIAGNOSIS:        A. Breast, left, biopsy:        - Breast tissue with dilated ducts and columnar cell change- See          Comment.        - Negative for atypia or malignancy. B. Breast, left 10 o'clock 6 cm from nipple, biopsy:        - Invasive mammary carcinoma of no special type (ductal, not          otherwise specified), [place] grade 1 (See Comment/ Checklist          Summary). Biomarkers (Performed on block B1):           Estrogen receptor: Positive (>95%, strong intensity)           Progesterone receptor: Positive (>95%, strong intensity)           HER2 IHC: Negative (Score 1+)     COMMENT:   Part A: Rare calcification is seen in dilated lumen; please correlate   with radiologic findings. Part B: BREAST CANCER (NEEDLE CORE BIOPSIES)   Invasive carcinoma: Present      Longest length on slide: 7 mm      Histologic type: Invasive mammary carcinoma of no special type   (ductal, not otherwise specified)   Note: P63 and Calponin stains reveal absence of myoepithelial cell layer   supporting invasive carcinoma.       Histologic Grade ([place] Histologic Score): Grade 1 (total score   4; tubule/ nuclear/ mitotic score: 2/1/1)   Ductal carcinoma in-situ: Focally Present      Nuclear grade: Low      Architectural pattern: Cribriform      Necrosis: Not identified      JINMI/JINMI       Preoperative Diagnosis:  calcifications   Postoperative Diagnosis:  :  calcifications     SPECIMEN:   A. LEFT BREAST, UPPER OUTER FORMALIN 1050   B. LEFT BREAST, 10 OCLOCK 6CMFN FORMALIN 1135     GROSS DESCRIPTION:   A. Received in formalin, labeled with the patient's name Graciela BAXTER\" and additionally labeled \"left\". The specimen consists of 7   tan yellow to white, fibrofatty needle core biopsies. The needle cores   have lengths of up to 2.7 cm. The tissue is submitted in toto in   cassette A2. Additionally received in the container is a blue plastic tissue cassette   filled with similar appearing fatty tissue fragments. The tissue   fragments are submitted in toto in cassette A1. Note:  The specimen is placed in formalin on 1/19/23 at 10:50. Total   formalin exposure time is greater than 6 hours. B. Received in formalin, labeled with the patient's name Graciela BAXTER\" and additionally labeled \"left 10 o'clock 6 cm from nipple\". The specimen consists of 7 tan-white to red, erythematous, fibrous   needle core biopsies. The needle cores have lengths of up 1.4 cm. The   specimen is submitted in toto in cassette B1. Note: The specimen is placed in formalin on 1/19/23 at 11:35. Total   formalin exposure time is greater than 6 hours. JESSICA/ROSALEE     MICROSCOPIC DESCRIPTION: Microscopic examination performed. Cold Ischemia and Fixation Times   Meet requirements specified in latest version of the ASCO / CAP   Guidelines   Performed by immunohistochemistry with manual quantitation. ER clone is   [place] SP1. TN clone is [place] clone [id number]. HER2 clone is [facility]   anti-her-h2ineu (4B5). Tests are performed on formalin fixed paraffin   embedded tissue using ULTRAVIEW universal DAB detection kit. Positive and   negative control tissue shows appropriate staining.    ER and TN scoring includes the percentage of cells staining positive and average intensity of expression. Interpretation follows the ASCO/CAP   guidelines, with any staining of >1% considered positive. ER results   between 1 and 10% are considered are considered low positive. Her2/marco scoring follows the ASCO/CAP guidelines: 0, 1+, 2+, 3+. Her2/marco   \"positive\" (3+) requires circumferential membrane staining that is   complete, intense and in >10% of tumor cells. Equivocal (2+) cases are   defined as weak to moderate complete membrane staining in >10% of cells. Negative cases (0 or 1+, respectively) display no staining or incomplete   membrane staining that is faint/barely perceptible and in >10% of tumor   cells. Select cases, including all 2+/equivocal for Her2/marco on routine   IHC staining, will be sent for Her2/marco analysis by FISH. References:   Willow Chambers et al, Estrogen and Progesterone Receptor Testing in   Breast Cancer, Arch Pathol Lab Med, Vol 144, May, 2020   Mukesh Cook et al, Human Epidermal Growth Factor Receptor 2 Testing in   Breast Cancer, Arch Pathol Lab Med, Vol 142, November, 2018   All immunohistochemical (IHC) stains were performed using single   antibodies on separate tissue sections. No multiple antibody cocktails   were used unless specifically documented. Appropriate positive controls   were performed with each antibody and the results were reviewed. The   control stains showed the expected positive results within technically   acceptable parameters. Analyte specific reagent (ASR) disclaimer: The use of one or more   reagents in the above tests is regulated as an analyte specific reagent. These tests were developed and their performance characteristics   determined by the [facility]. They have   not been cleared by the [employer] and Drug Administration. The FDA has   determined that such clearance or approval is not necessary.    Technical component is performed at [facility].       CPT: 04530 X2   07207 X1   76630 X1   54002 X3     Case signed out at [facility], [address].[facility], [address]   Technical processing at [facility], [address] [facility]   [address] [address]  Phone [phone]         Capri Fang M.D.   (Electronic Signature)   01/20/2023     Past Medical History:   Diagnosis Date    Hypertension     Spontaneous vaginal delivery     2006,2008   :        Past Surgical History:   Procedure Laterality Date    BREAST SURGERY Right 08/18/2020    RIGHT BREAST RADIOFREQUENCY TAGGED EXCISIONAL BREAST BIOPSY performed by Emory Horton MD at [address]      lump removed    CARDIAC SURGERY      ablasion for irreg HB    FRACTURE SURGERY      leg    KNEE SURGERY      [facility] LOC BREAST BIOPSY LEFT Left 1/19/2023    CONNIE STEROTACTIC LOC BREAST BIOPSY LEFT 1/19/2023 [facility] W LOC DEVICE 1ST LESION LEFT Left 1/19/2023    US BREAST BIOPSY W LOC DEVICE 1ST LESION LEFT 1/19/2023 [facility] ULTRASOUND    US BREAST BIOPSY W LOC DEVICE 1ST LESION RIGHT  07/20/2020    US BREAST NEEDLE BIOPSY RIGHT 7/20/2020 [address] [facility] ULTRASOUND    US GUIDED NEEDLE LOC OF RIGHT BREAST  08/17/2020    US GUIDED NEEDLE LOC OF RIGHT BREAST 8/17/2020 [address] [facility]   :        Allergies as of 02/02/2023 - Fully Reviewed 02/02/2023   Allergen Reaction Noted    Morphine Anxiety 04/14/2010   :        Social History     Tobacco Use    Smoking status: Never    Smokeless tobacco: Never   Vaping Use    Vaping Use: Never used   Substance Use Topics    Alcohol use: Yes     Comment:  Occ    Drug use: No   :      Family History:  Paternal aunts: Breast cancer, diagnosed age 61  Father: Prostate cancer diagnosed age 62s and lymphoma diagnosed age 39  Maternal grandfather: Prostate cancer/lymphoma diagnosed age 62s  Brother: Lymphoma, diagnosed age 32  No additional family history of breast or ovarian cancer  Please see intake form for additional family details. Hormonal History:   a.0  m.0  Menarche at age 17. First live birth at age 31. did breastfeed. perimenopausal  Hysterectomy: no hysterectomy  No estrogen supplementation  OCP not taking    Medications:  Medication documentation has been reviewed in the electronic medical record and patient office intake form. Exam:  /89   Pulse 65   Resp 18   Ht 6' (1.829 m)   Wt 224 lb (101.6 kg)   SpO2 97%   BMI 30.38 kg/m²     Constitutional: She appears well-nourished. No apparent distress. Breast: The patient was examined in the upright and supine position. She has a \"C\" cup breast. Breasts are symmetrically ptotic. Right: There were no new masses or changes in breast contour. There were no skin changes of the breast or nipple areolar complex. There was no nipple inversion or discharge. Left: There is mild ecchymosis related to her biopsy. There were no new masses or changes in breast contour. There were no skin changes of the breast or nipple areolar complex. There was no nipple inversion or discharge. There is no axillary lymphadenopathy palpated bilaterally. Head: Normocephalic and atraumatic. Eyes: EOM are normal. Pupils are equal, round, and reactive to light. Neck: Neck supple. No tracheal deviation present. Cardiovascular: regular rate. Extremities appear well perfused. Pulmonary: respirations are non-labored and without audible distress  Lymphatics: no palpable axillary or cervical lymphadenopathy. Skin: No rash noted. No erythema. Neurologic: alert and oriented. Assessment/Plan:  [id number]:  IA left breast cancer  ER + VT+ HER2 negative. I have reviewed the results of her most recent breast imaging and pathology with her. The surgical rational and technical details of undergoing breast conservation therapy versus a mastectomy were reviewed.  She understood that patients who undergo breast conservation therapy, systemic therapy, and radiotherapy have comparable local recurrences and overall survival to those patients undergoing a mastectomy. She understands that patients may experience an asymmetric change in breast contour with breast conservation that can be exacerbated with radiation therapy. We discussed the technique of localization. I explained that prior to surgery she will be taken to radiology where they will target the lesion near which the clip was placed post biopsy. This is performed using either mammographic or ultrasound guidance. We discussed the aspects of breast conservation including the need for negative margins. We discussed the risk of up to a 15-20% chance of having a positive margin and that this would in fact lead to additional surgery. We discussed marking the surgical cavity for potential future radiation. We also discussed the option of a mastectomy. We discussed that although we remove the breast in this procedure, there is still a risk of recurrent disease and reviewed expectations on residual breast tissue. We discussed margins. We reviewed possible hospitalization and the need for surgical drains. We discussed additional risk and benefits of surgery which include but are not limited to anesthesia related risks, bleeding, range of motion difficulty, chronic pain and or numbness, infection (<7%), wound complications and unappealing cosmetics. We discussed the risk of contralateral breast cancer. We also discussed the possibility of future recurrences. We reviewed expectations for recovery. We discussed a sentinel lymph node biopsy in great detail. I described the procedure of both an injection of a radioisotope as well as blue dye with migration to the axilla. I discussed the side effects including discoloration of the urine, stool, and breast as well as the possibility that the axilla would not map.  We discussed the possibility of a false negative result and the potential need for further surgery. We reviewed the 3-5% risk of lymphedema. We discussed additional risks such as permanent arm numbness, the potential for nerve injury, and the possibility of a false negative finding. I discussed the fact that if we identified positive nodes, she may require a completion lymph node dissection. I reviewed the details of a level I and II ALND procedure as well as its risk of lymphedema as it compares to SLNB. Systemic therapy including chemotherapy and endocrine therapy were reviewed. I have recommended she undergo a consultation with medical oncology to discuss these treatments. We discussed that radiation therapy is traditionally given to patients undergoing breast conservation therapy to reduce the risk of local recurrence. I have recommended she undergo a postoperative consultation with radiation oncology to discuss this treatment. Fertility does not apply. The role of genetic consultation was discussed. She has already underwent genetic testing with results pending. Reconstructive options in patients undergoing breast conservation versus a mastectomy were reviewed. We discussed a skin versus nipple and skin sparing mastectomy. She is not an ideal candidate for an [facility]. Anticipated clinic follow up and survivorship were discussed. The potential need for long term central IV access was briefly reviewed. Self breast evaluation was reviewed. We reviewed prehab/rehab opportunities. At this point she is not certain but may want to move forward with bilateral mastectomies to ultimately decrease her risk of recurrence or contralateral breast cancer most significantly. A left sentinel lymph node biopsy will be planned regardless. If breast conservation is desired we will consider breast MRI due to her heterogenous density.   If she decides to move forward with mastectomies she is interested in reconstruction and we will refer her for consultation. All of the patient's questions were answered at this time however, she was encouraged to call the office with any further inquiries. Approximately 80 minutes of time were spent in preparation, direct patient contact, record review, imaging interpretation, care coordination, documentation and activities otherwise related to this encounter.

## 2023-02-02 NOTE — TELEPHONE ENCOUNTER
Patient returned Fernanda's call. She said she can arrive 15 minutes early for the intake OR if you would still rather do it over the phone, then just call her & she would be better about answering her phone.

## 2023-02-07 NOTE — PROGRESS NOTES
[facility]    CC: Breast CA     Referring physician: Saniya Quintana MD    HPI: This is a 41 y.o. female with a PMHx as delineated below who presents in consultation for breast reconstruction. She was found to have left breast cancer and desires to proceed with unilateral vs bilateral mastectomy with reconstruction. Plastic surgery was consulted for evaluation and treatment. The specifics of her breast cancer workup / treatment is as follows:     Diagnosis: invasive ductal  Mo/Yr Diagnosed: 1/23  Breast Involved:Left  Tumor Size & Grade: [id number]  Stage: 1A  Vicente status: Negative  ER: Positive CO: Positive HER2: Negative    Radiation: None    Chemo/Meds: None  Pregnancy/Miscarriages: 4 / 0    PMHx:   Past Medical History:   Diagnosis Date    Hypertension     Spontaneous vaginal delivery     2006,2008     PSHx:   Past Surgical History:   Procedure Laterality Date    BREAST SURGERY Right 08/18/2020    RIGHT BREAST RADIOFREQUENCY TAGGED EXCISIONAL BREAST BIOPSY performed by Vicki Anderson MD at [address]      lump removed    CARDIAC SURGERY      ablasion for irreg HB    FRACTURE SURGERY      leg    KNEE SURGERY      [facility] STEROTACT[facility] LOC BREAST BIOPSY LEFT Left 1/19/2023    CONNIE STEROTACTIC LOC BREAST BIOPSY LEFT 1/19/2023 [facility] [address] BREAST BIOPSY W LOC DEVICE 1ST LESION LEFT Left 1/19/2023    US BREAST BIOPSY W LOC DEVICE 1ST LESION LEFT 1/19/2023 [facility] ULTRASOUND    US BREAST BIOPSY W LOC DEVICE 1ST LESION RIGHT  07/20/2020    US BREAST NEEDLE BIOPSY RIGHT 7/20/2020 [facility]TR[facility]    US GUIDED NEEDLE LOC OF RIGHT BREAST  08/17/2020    US GUIDED NEEDLE LOC OF RIGHT BREAST 8/17/2020 Yakov Dixon MD [facility]TR[facility]     Allergies:    Allergies   Allergen Reactions    Morphine Anxiety     FHx: Past history of breast CA: No  Meds:   Current Outpatient Medications   Medication Sig Dispense Refill    enalapril (VASOTEC) 2.5 MG tablet TAKE 1 TABLET BY MOUTH DAILY 90 tablet 2    carvedilol (COREG) 3.125 MG tablet TAKE 1 TABLET BY MOUTH TWICE DAILY WITH MEALS 180 tablet 2     No current facility-administered medications for this visit. SocHx: Smoking: No    ETOH: occasional    Drug use: No    ROS   Constitutional: Negative for chills and fever. HENT: Negative for congestion, facial swelling, and voice change. Eyes: Negative for photophobia and visual disturbance. Respiratory: Negative for apnea, cough, chest tightness and shortness of breath. Cardiovascular: Negative for chest pain and palpitations. Gastrointestinal: Negative for dysphagia and early satiety. Genitourinary: Negative for difficulty urinating, dysuria, flank pain, frequency and hematuria. Musculoskeletal: Negative for new gait problem, joint swelling and myalgias. Skin: Negative for color change, pallor and rash. Endocrine: negative for tremors, temperature intolerance or polydipsia. Allergic/Immunologic: Negative for new environmental or food allergies. Neurological: Negative for dizziness, seizures, speech difficulty, numbness. Hematological: Negative for adenopathy. Psychiatric/Behavioral: Negative for agitation and confusion. EXAM  /86   Pulse 68   Temp 97.7 °F (36.5 °C)   Ht 6' (1.829 m)   Wt 229 lb (103.9 kg)   BMI 31.06 kg/m²     GEN: NAD, pleasant, healthy  CVS: RRR  PULM: No respiratory distress  HEENT: PERRLA/EOMI; hearing appears within normal limits  NECK: Supple with trachea in midline, no masses  EXT: No lymphedema noted  ABD: soft/NT/ND   NEURO: No focal deficits, no obvious CN deficits  BACK: Bilateral latiss muscle intact  BREAST:   Physical Exam    Bra size: 38C  Desired bra size: Same size  Ptosis grade:   R: 3   L: 3  The left breast size is larger than the right breast.  There were no palpable masses with no palpable lymphadenopathy in the ipsilateral left axilla.    Nipple retraction: No     Left breast sternal notch to nipple: 27.6 cm  Right breast sternal notch to nipple: 25.6 cm    Left breast nipple to inframammary fold: 5.6 cm  Right breast nipple to inframammary fold: 5.9 cm    Left breast width 18.8 cm  Right breast width 18.1 cm    IMAGING: Reviewed    IMP: 41 y.o. female with breast cancer who presents for discussion regarding reconstruction options  PLAN: We discussed options with Mirella Rowe and her  including NSM vs non-NSM with reconstruction. Will plan for immediate DTI (possible TE) via a non-NSM approach with possible Shine pattern Autoderm. Will work with Dr. Jonelle King and schedule. A discussion regarding surgical options including immediate vs delayed approaches, implant based vs autologous, as well as additional planned revisional surgeries was performed with the patient and family. Multiple autologous donor sites were discussed as well. Clinical photos were obtained. We additionally discussed the FDA recommendations regarding monitoring of silicone implants. The risks, benefits, alternatives, outcomes, personnel involved were discussed. Specifically, the risks including, but not limited to: bleeding that may necessitate transfusion or operation, infection, seroma, reoperation, nonhealing, poor cosmetic outcome, asymmetry, scarring, partial or total flap loss, donor site morbidity, VTE (DVT/PE), and death were discussed. All questions were answered in a satisfactory manner according to the patient. Total encounter time: 60 min with > 50% spent with face to face (or vitual) counseling and coordination of care.     James Ricci MD  [facility]  02/08/23

## 2023-02-08 ENCOUNTER — OFFICE VISIT (OUTPATIENT)
Dept: SURGERY | Age: 48
End: 2023-02-08
Payer: COMMERCIAL

## 2023-02-08 VITALS
TEMPERATURE: 97.7 F | HEART RATE: 68 BPM | HEIGHT: 72 IN | BODY MASS INDEX: 31.02 KG/M2 | WEIGHT: 229 LBS | SYSTOLIC BLOOD PRESSURE: 138 MMHG | DIASTOLIC BLOOD PRESSURE: 86 MMHG

## 2023-02-08 DIAGNOSIS — C50.912 MALIGNANT NEOPLASM OF LEFT FEMALE BREAST, UNSPECIFIED ESTROGEN RECEPTOR STATUS, UNSPECIFIED SITE OF BREAST (HCC): Primary | ICD-10-CM

## 2023-02-08 PROCEDURE — 99205 OFFICE O/P NEW HI 60 MIN: CPT | Performed by: SURGERY

## 2023-02-08 NOTE — LETTER
Surgery Schedule Request Form  [facility]  [address] Aryan Garcia  DATE OF SURGERY: 2023     TIME OF SURGERY: 8:30 am      Confirmation#:__________________        Surgeon Name: Brayan Carias MD (Primary Surgeon, Will Be Available At [facility])   Phone: [phone]     Fax: [phone]  Co-Case Additional Surgeon: Huong Schmitz MD (Will start)  Procedure Name: 1) Bilateral breast reconstruction with direct to implant reconstruction with Alloderm (possible Autoderm)            2) Possible stage 1 tissue expander placement with Alloderm   CPT CODES (required for scheduling): 32069, 85854 & 86820  DIAGNOSIS: C50.912  Breast Cancer    LENGTH OF PROCEDURE: 2 hours  Patient Status: 23 hour observation    Labs Needed:   CBC _x__  PT/PTT_x__ INR __x__  CMP _x__ EKG __x_   Urine Hcg ___            PATIENT NAME: Cherry Ford            YOB: 1975  SEX: female   SS #:   PHONE: [phone] (home)     Pre-Op to be done by: PCP  Cardiac Clearance Done by: per PCP    Pt Position:  supine  Patient to meet with Anesthesiology prior to surgery: no     Medications to be stopped 5 days before surgery: anticoagulation     Ancef 2 gm IV OCTOR (NOTE:  If patient weight is > 120 kg, Administer 3 gm)  Other Orders: Transexemic acid 1g IV OCTOR; No Decadron; SA    INSURANCE: [employer] PAMELA Llanos NAME: Self  MEMBER ID: [id number]                                   AUTHORIZATION #: [id number]    PCP: Shirin Camargo MD                                        ANESTHESIA:  General    Brayan Carias MD                             FAX TO: [phone]   QUESTIONS?  CALL: [address]   Fax   [phone]            Date Of Procedure: 2023    PATIENT:       Cherry Ford                    :  1975        Allergies   Allergen Reactions    Morphine Anxiety       No.   PHYSICIAN ORDERS HUC/  RN      ORDERS WITH CHECK BOXES MUST BE SELECTED. ALL OTHER ORDERS WILL BE AUTOMATICALLY INITIATED. Date / Time of Order:   2/10/23   11:10 AM          Procedure:  Bilateral breast reconstruction with direct to implant reconstruction with Alloderm (possible Autoderm)            2) Possible stage 1 tissue expander placement with Alloderm        1. Cefazolin (Ancef) 2 gm IV OCTOR   ** NOTE:  If patient weight is > 120 kg, Administer 3 gm         2. ** If PCN allergy, then Clindamycin 600mg IV OCTOR.   ** If prior history of MRSA, Vancomycin 1g IV OCTOR (please check with renal function prior to administration)       3.  Other orders: Transexemic acid 1g IV OCTOR; No Decadron; SA     Physician / Surgeon:  Nando Price MD  Office Ph:  [phone]       Physician Signature:     9/07

## 2023-02-09 ENCOUNTER — TELEPHONE (OUTPATIENT)
Dept: SURGERY | Age: 48
End: 2023-02-09

## 2023-02-09 NOTE — TELEPHONE ENCOUNTER
The patient was in the office to see Dr. Lazaro Duenas yesterday. PLAN: We discussed options with Troy Menjivar and her  including NSM vs non-NSM with reconstruction. Will plan for immediate DTI (possible TE) via a non-NSM approach with possible Shine pattern Autoderm. Will work with Dr. Inés Eisenberg and schedule. I received a surgery letter. I spoke with Mayelin Scruggs at [facility] ([phone]) to see if CPT Code 87556, 85760 or 71416 require pre certification. CPT Codes 07545 do not require pre certification, but CPT Code 99726 and 02190 do. I faxed a [employer] and clinicals today. I will scan the information that I faxed and the fax success into Epic under the media tab. I spoke with the patient at the home number listed to provide an insurance update. I sent Fernanda with the office of Dr. Inés Eisenberg a TEAMS message to find OR time. I will leave this phone note open. Yes

## 2023-02-10 DIAGNOSIS — C50.912 PRIMARY BREAST MALIGNANCY, LEFT (HCC): Primary | ICD-10-CM

## 2023-02-10 NOTE — TELEPHONE ENCOUNTER
I received a fax from [facility] dated 2-9-2023. I will scan the fax into Epic under the media tab. [address] [facility], [id number], [address]  Date Range 2-9-2023 to 8-9-2023  Reference Number [id number]    I spoke with the patient at the home number listed. The patient is now scheduled for surgery with  on 4-4-2023. The patient is aware of H&P. The patient is scheduled for her post op appointment 4-. I will submit the surgery letter to [facility] today. I will fax the Alloderm order today. I will scan the order and the fax success into Epic under the media tab. I will mail the surgery information and instructions to the patient today. I will close this phone note.

## 2023-02-14 ENCOUNTER — TELEPHONE (OUTPATIENT)
Dept: CARDIOLOGY CLINIC | Age: 48
End: 2023-02-14

## 2023-03-06 ENCOUNTER — OFFICE VISIT (OUTPATIENT)
Dept: FAMILY MEDICINE CLINIC | Age: 48
End: 2023-03-06

## 2023-03-06 VITALS
HEART RATE: 55 BPM | HEIGHT: 72 IN | DIASTOLIC BLOOD PRESSURE: 86 MMHG | RESPIRATION RATE: 18 BRPM | OXYGEN SATURATION: 98 % | BODY MASS INDEX: 30.96 KG/M2 | TEMPERATURE: 98.2 F | WEIGHT: 228.6 LBS | SYSTOLIC BLOOD PRESSURE: 110 MMHG

## 2023-03-06 DIAGNOSIS — I49.3 PVC (PREMATURE VENTRICULAR CONTRACTION): ICD-10-CM

## 2023-03-06 DIAGNOSIS — C50.912 MALIGNANT NEOPLASM OF LEFT FEMALE BREAST, UNSPECIFIED ESTROGEN RECEPTOR STATUS, UNSPECIFIED SITE OF BREAST (HCC): ICD-10-CM

## 2023-03-06 DIAGNOSIS — Z01.818 PRE-OP EXAM: Primary | ICD-10-CM

## 2023-03-06 ASSESSMENT — PATIENT HEALTH QUESTIONNAIRE - PHQ9
2. FEELING DOWN, DEPRESSED OR HOPELESS: 0
SUM OF ALL RESPONSES TO PHQ QUESTIONS 1-9: 0
SUM OF ALL RESPONSES TO PHQ QUESTIONS 1-9: 0
1. LITTLE INTEREST OR PLEASURE IN DOING THINGS: 0
SUM OF ALL RESPONSES TO PHQ9 QUESTIONS 1 & 2: 0
SUM OF ALL RESPONSES TO PHQ QUESTIONS 1-9: 0
SUM OF ALL RESPONSES TO PHQ QUESTIONS 1-9: 0

## 2023-03-06 NOTE — H&P (VIEW-ONLY)
mucous membranes are normal.   Eyes: Conjunctivae and EOM are normal. Pupils are equal, round, and reactive to light. Neck: Trachea normal and normal range of motion. Neck supple. No JVD present. Carotid bruit is not present. No mass and no thyromegaly present. Cardiovascular: Normal rate, regular rhythm, normal heart sounds and intact distal pulses. No murmur heard. Pulmonary/Chest: Effort normal and breath sounds normal. No respiratory distress. She has no wheezes. She has no rales. Abdominal: Soft. Normal aorta and bowel sounds are normal. She exhibits no distension and no mass. No tenderness. Musculoskeletal: She exhibits no edema and no tenderness. Neurological: She is alert and oriented to person, place, and time. She has normal strength. No cranial nerve deficit or sensory deficit. Coordination and gait normal.   Skin: Skin is warm and dry. No rash noted. No erythema. Psychiatric: She has a normal mood and affect. Her behavior is normal.     EKG Interpretation:  normal EKG, normal sinus rhythm, poor R progression( no change from previous EKG, unchanged from previous tracings. Lab Review not applicable  Nl CBC and CMP 12/29/22        Assessment:       41 y.o. patient with planned surgery as above. Known risk factors for perioperative complications: hx of PVCs  Current medications which may produce withdrawal symptoms if withheld perioperatively: none      Plan:     1. Preoperative workup as follows: ECG  2. Change in medication regimen before surgery: None  3.  Prophylaxis for cardiac events with perioperative beta-blockers:. Pt is on Coreg  ACC/AHA indications for pre-operative beta-blocker use:    Vascular surgery with history of postitive stress test  Intermediate or high risk surgery with history of CAD   Intermediate or high risk surgery with multiple clinical predictors of CAD- 2 of the following: history of compensated or prior heart failure, history of cerebrovascular disease, DM,

## 2023-03-06 NOTE — PROGRESS NOTES
Preoperative Consultation      Tutu Fuller  YOB: 1975    Date of Service:  3/6/2023    Vitals:    03/06/23 1047   BP: 110/86   Pulse: 55   Resp: 18   Temp: 98.2 °F (36.8 °C)   SpO2: 98%   Weight: 228 lb 9.6 oz (103.7 kg)   Height: 6' (1.829 m)      Wt Readings from Last 2 Encounters:   03/06/23 228 lb 9.6 oz (103.7 kg)   02/08/23 229 lb (103.9 kg)     BP Readings from Last 3 Encounters:   03/06/23 110/86   02/08/23 138/86   02/02/23 136/89        Chief Complaint   Patient presents with    Pre-op Exam     Allergies   Allergen Reactions    Morphine Anxiety     No outpatient medications have been marked as taking for the 3/6/23 encounter (Office Visit) with Anitra Richmond MD.       This patient presents to the office today for a preoperative consultation at the request of surgeon, Dr. Angel Bradford, who plans on performing bilateral mastectomy on April 4 at [place] [address].  The current problem began 2 months ago, and symptoms have been unchanged with time. Conservative therapy: N/A.     Planned anesthesia: General   Known anesthesia problems: None   Bleeding risk: No recent or remote history of abnormal bleeding  Personal or FH of DVT/PE: No    Patient objection to receiving blood products: No    Patient Active Problem List   Diagnosis    Status post induction of labor    Spontaneous vaginal delivery    Benign neoplasm of skin    PVC (premature ventricular contraction)    Ventricular tachycardia    LV dysfunction    Class 1 obesity due to excess calories with serious comorbidity and body mass index (BMI) of 32.0 to 32.9 in adult       Past Medical History:   Diagnosis Date    Hypertension     Spontaneous vaginal delivery     2006,2008     Past Surgical History:   Procedure Laterality Date    BREAST SURGERY Right 08/18/2020    RIGHT BREAST RADIOFREQUENCY TAGGED EXCISIONAL BREAST BIOPSY performed by Jael Jay MD at [address] [place] [address]      lump removed    CARDIAC SURGERY ablasion for irreg HB    FRACTURE SURGERY      leg    KNEE SURGERY      [facility]IE STEROT[facility] LOC BREAST BIOPSY LEFT Left 1/19/2023    CONNIE STEROTACTIC LOC BREAST BIOPSY LEFT 1/19/2023 [facility] [place]    US BREAST BIOPSY W LOC DEVICE 1ST LESION LEFT Left 1/19/2023    US BREAST BIOPSY W LOC DEVICE 1ST LESION LEFT 1/19/2023 [facility] ULTRASOUND    US BREAST BIOPSY W LOC DEVICE 1ST LESION RIGHT  07/20/2020    US BREAST NEEDLE BIOPSY RIGHT 7/20/2020 [address] [facility]TR[facility]    US GUIDED NEEDLE LOC OF RIGHT BREAST  08/17/2020    US GUIDED NEEDLE LOC OF RIGHT BREAST 8/17/2020 [address] [facility]     Family History   Problem Relation Age of Onset    Cancer Father     Asthma Brother     Allergies Son      Social History     Socioeconomic History    Marital status:      Spouse name: Not on file    Number of children: Not on file    Years of education: Not on file    Highest education level: Not on file   Occupational History    Not on file   Tobacco Use    Smoking status: Never    Smokeless tobacco: Never   Vaping Use    Vaping Use: Never used   Substance and Sexual Activity    Alcohol use: Yes     Comment: Occ    Drug use: No    Sexual activity: Yes     Partners: Male   Other Topics Concern    Not on file   Social History Narrative    Not on file     Social Determinants of Health     Financial Resource Strain: Not on file   Food Insecurity: Not on file   Transportation Needs: Not on file   Physical Activity: Not on file   Stress: Not on file   Social Connections: Not on file   Intimate Partner Violence: Not on file   Housing Stability: Not on file       Review of Systems  A comprehensive review of systems was negative except for what was noted in the HPI. Physical Exam   Constitutional: She is oriented to person, place, and time. She appears well-developed and well-nourished. No distress. HENT:   Head: Normocephalic and atraumatic.    Mouth/Throat: Uvula is midline, oropharynx is clear and moist and mucous membranes are normal.   Eyes: Conjunctivae and EOM are normal. Pupils are equal, round, and reactive to light. Neck: Trachea normal and normal range of motion. Neck supple. No JVD present. Carotid bruit is not present. No mass and no thyromegaly present. Cardiovascular: Normal rate, regular rhythm, normal heart sounds and intact distal pulses. No murmur heard. Pulmonary/Chest: Effort normal and breath sounds normal. No respiratory distress. She has no wheezes. She has no rales. Abdominal: Soft. Normal aorta and bowel sounds are normal. She exhibits no distension and no mass. No tenderness. Musculoskeletal: She exhibits no edema and no tenderness. Neurological: She is alert and oriented to person, place, and time. She has normal strength. No cranial nerve deficit or sensory deficit. Coordination and gait normal.   Skin: Skin is warm and dry. No rash noted. No erythema. Psychiatric: She has a normal mood and affect. Her behavior is normal.     EKG Interpretation:  normal EKG, normal sinus rhythm, poor R progression( no change from previous EKG, unchanged from previous tracings. Lab Review not applicable  Nl CBC and CMP 12/29/22        Assessment:       41 y.o. patient with planned surgery as above. Known risk factors for perioperative complications: hx of PVCs  Current medications which may produce withdrawal symptoms if withheld perioperatively: none      Plan:     1. Preoperative workup as follows: ECG  2. Change in medication regimen before surgery: None  3.  Prophylaxis for cardiac events with perioperative beta-blockers:. Pt is on Coreg  ACC/AHA indications for pre-operative beta-blocker use:    Vascular surgery with history of postitive stress test  Intermediate or high risk surgery with history of CAD   Intermediate or high risk surgery with multiple clinical predictors of CAD- 2 of the following: history of compensated or prior heart failure, history of cerebrovascular disease, DM, or renal insufficiency    Routine administration of higher-dose, long-acting metoprolol in beta-blocker-naïve patients on the day of surgery, and in the absence of dose titration is associated with an overall increase in mortality. Beta-blockers should be started days to weeks prior to surgery and titrated to pulse < 70.  4. Deep vein thrombosis prophylaxis: per surgeon  5. No contraindications to planned surgery.  Pt will have clearance from cardiologist.

## 2023-03-08 NOTE — PROGRESS NOTES
[facility]   Electrophysiology Follow up   Date: 3/8/2023  I had the privilege of visiting Sachin Pradhan in the office. CC: PVC    HPI: Sachin Pradhan is a 41 y.o. female  a past medical history of PVC, cardiomyopathy, and obesity. Hx of PVC ablation (3/17 at [facility])    Monitor 12/20/2022 - showed PVC burden < 1%     Patient has developed breast cancer and  was told  she needs a physical by  her cardiologist within 30 days. She has a bilateral mastectomy scheduled 04/04/2023     Interval History: Jose Izaguirre presents today to obtain clearance for her  mastectomy. She is concerned about her BP control given reports of dilated aortic root. Assessment and plan:   PVC   Monitor 12/202022 - PVC burden <1%    S/p PVC ablation 03/2017   Continue coreg 3.125    H/o LV dysfunction. Echo 01/19/2023 55%    Continue Coreg and enalapril    Aortic root dilation   3.8 per Echo, ascending aorta 4.2   Her diastolic pressure has been consistently > 80 mm HG    Increase Enalapril to 5 mg - continue to monitor  diastolic pressure - Goal is for it to be < 80  *ASC AA   Date Detail   Status  Stable   CTC     TTE     Surgery  Sx, rupture, ED>5.5, AV surgery + AscAA >4.5   Surv  Q12 months (3.5-4.5cm)  Q6 months (4.5-5.4cm)   Plan  Noncontrast chest CT before December appointment.       Plan:   Her PVC are controlled - she would like to continue to follow with EP   F/u with NPSR as scheduled in December  and me  in the spring   She may proceed with bilateral mastectomy   Noncontrast CT before her appointment December    Patient Active Problem List    Diagnosis Date Noted    LV dysfunction 12/08/2022    Class 1 obesity due to excess calories with serious comorbidity and body mass index (BMI) of 32.0 to 32.9 in adult 12/08/2022    Ventricular tachycardia 03/29/2017    PVC (premature ventricular contraction) 02/10/2017    Spontaneous vaginal delivery     Status post induction of labor 08/08/2011    Benign neoplasm of skin 02/13/2006     Diagnostic studies:   ECG 3/8/23  SR , QTcH 422,QRS 90    Echo 01/19/2023    Summary   Normal left ventricle size, wall thickness and systolic function with an   estimated ejection fraction of 55%. No regional wall motion abnormalities   are seen. Normal diastolic function. E/e' = 5.1. Left atrium is dilated by LA volume. Aortic root is dilated. 3.8 cm. Ascending aorta is dilated. 4.2 cm. RVSP = 18 mmHG. Echo: 7/20   -Normal left ventricle size, wall thickness, and systolic function with an   estimated ejection fraction of 55%. Normal diastolic function.   -Trivial tricuspid regurgitation with PASP of 18 mmHg. GXT: None     Holter: 4/17/2018  SR. HR  (70)  Frequent PVCs with burden at 20.1%. No symptoms reported       I independently reviewed the cardiac diagnostic studies, ECG and relevant imaging studies. Lab Results   Component Value Date    LVEF 55 01/19/2023    LVEFMODE Echo 04/10/2019     Lab Results   Component Value Date    TSH 1.20 06/27/2016         Physical Examination:  There were no vitals filed for this visit. Wt Readings from Last 3 Encounters:   03/06/23 228 lb 9.6 oz (103.7 kg)   02/08/23 229 lb (103.9 kg)   02/02/23 224 lb (101.6 kg)       Constitutional: Oriented. No distress. Head: Normocephalic and atraumatic. Mouth/Throat: Oropharynx is clear and moist.   Eyes: Conjunctivae normal. EOM are normal.   Neck: Neck supple. No rigidity. No JVD present. Cardiovascular: Normal rate, regular rhythm, S1&S2. Pulmonary/Chest: Bilateral respiratory sounds. No wheezes, No rhonchi. Abdominal: Soft. Bowel sounds present. No distension, No tenderness. Musculoskeletal: No tenderness. No edema    Lymphadenopathy: Has no cervical adenopathy. Neurological: Alert and oriented. Cranial nerve appears intact, No Gross deficit   Skin: Skin is warm and dry. No rash noted.    Psychiatric: Has a normal behavior       Review of System:  [x] Full ROS obtained and negative except as mentioned in HPI    Prior to Admission medications    Medication Sig Start Date End Date Taking? Authorizing Provider   enalapril (VASOTEC) 2.5 MG tablet TAKE 1 TABLET BY MOUTH DAILY 10/5/22   SHAYY Gutiérrez - CNP   carvedilol (COREG) 3.125 MG tablet TAKE 1 TABLET BY MOUTH TWICE DAILY WITH MEALS 10/5/22   Edison Ayala, APRN - CNP       Allergies   Allergen Reactions    Morphine Anxiety       Social History:  Reviewed. reports that she has never smoked. She has never used smokeless tobacco. She reports current alcohol use. She reports that she does not use drugs. Family History:  Reviewed. Reviewed. No family history of SCD. Relevant and available labs, and cardiovascular diagnostics reviewed. Reviewed. I independently reviewed relevant and available cardiac diagnostic tests ECG, CXR, Echo, Stress test, Device interrogation, Holter, CT scan. Outside medical records via Care everywhere reviewed and summarized in H&P above. Complex medical condition with multiple medical problems affecting prognosis and outcome of EP interventions       - The patient is counseled to follow a low salt diet to assure blood pressure remains controlled for cardiovascular risk factor modification.   - The patient is counseled to avoid excess caffeine, and energy drinks as this may exacerbated ectopy and arrhythmia. - The patient is counseled to get regular exercise 3-5 times per week to control cardiovascular risk factors. - The patient is counseled to lose weigt to control cardiovascular risk factors. All questions and concerns were addressed to the patient/family. Alternatives to my treatment were discussed. I have discussed the above stated plan and the patient verbalized understanding and agreed with the plan. Scribe attestation:  This note was scribed in the presence of Vijaya Wilcox MD by Saul Salinas RN    I, Dr. Vijaya Wilcox personally performed the services described in this documentation as scribed by RN in my presence, and it is both accurate and complete. NOTE: This report was transcribed using voice recognition software. Every effort was made to ensure accuracy, however, inadvertent computerized transcription errors may be present.      Vale Simpson MD, Fouzia Geuvara [address] [facility]   Office: [phone]  Fax: [phone]

## 2023-03-09 ENCOUNTER — OFFICE VISIT (OUTPATIENT)
Dept: CARDIOLOGY CLINIC | Age: 48
End: 2023-03-09
Payer: COMMERCIAL

## 2023-03-09 ENCOUNTER — TELEPHONE (OUTPATIENT)
Dept: CARDIOLOGY CLINIC | Age: 48
End: 2023-03-09

## 2023-03-09 VITALS
HEART RATE: 60 BPM | SYSTOLIC BLOOD PRESSURE: 114 MMHG | BODY MASS INDEX: 30.48 KG/M2 | DIASTOLIC BLOOD PRESSURE: 86 MMHG | OXYGEN SATURATION: 95 % | HEIGHT: 72 IN | WEIGHT: 225 LBS

## 2023-03-09 DIAGNOSIS — I49.3 PVC (PREMATURE VENTRICULAR CONTRACTION): Primary | ICD-10-CM

## 2023-03-09 DIAGNOSIS — C80.1 CANCER (HCC): ICD-10-CM

## 2023-03-09 DIAGNOSIS — I42.0 DILATED CARDIOMYOPATHY (HCC): ICD-10-CM

## 2023-03-09 DIAGNOSIS — I77.810 AORTIC ROOT DILATION (HCC): ICD-10-CM

## 2023-03-09 PROCEDURE — 99214 OFFICE O/P EST MOD 30 MIN: CPT | Performed by: INTERNAL MEDICINE

## 2023-03-09 PROCEDURE — 93000 ELECTROCARDIOGRAM COMPLETE: CPT | Performed by: INTERNAL MEDICINE

## 2023-03-09 RX ORDER — ENALAPRIL MALEATE 5 MG/1
TABLET ORAL
Qty: 90 TABLET | Refills: 3 | Status: SHIPPED | OUTPATIENT
Start: 2023-03-09

## 2023-03-09 NOTE — LETTER
[facility] CARDIOLOGY[address]  Dept: [phone]  Dept Fax: [phone]  1975  3/8/23      To Whom It May Concern:      Ila Garcia has been seen in the electrophysiology office for premature ventricular contraction. Echocardiogram 01/19/2023 showed and LVEF of 55%. She is  already on Beta blocker therapy. There are no apparent interventions to ameliorate the cardiac risk. From an electrophysiology standpoint she may proceed with bilateral mastectomy and breast reconstruction. This clinical assessment assumes a full anesthesia/pulmonary/internal medicine evaluation for overall risk of airway management, type and route of anesthetic, and other relevant anesthesia-specific considerations. Discussion about risks, benefits and alternative of procedure per surgical team.     Please contact my office with any further questions or concerns.      Sincerely,      Kris Ovalle MD

## 2023-03-09 NOTE — TELEPHONE ENCOUNTER
Please call patient and let her know that LULI would like her to have a non contrast CT to assess her aortic root  in November before her follow up with NPSR - please give her scheduleing number

## 2023-03-21 ENCOUNTER — NURSE ONLY (OUTPATIENT)
Dept: SURGERY | Age: 48
End: 2023-03-21

## 2023-03-21 ENCOUNTER — TELEPHONE (OUTPATIENT)
Dept: SURGERY | Age: 48
End: 2023-03-21

## 2023-03-21 DIAGNOSIS — C50.912 MALIGNANT NEOPLASM OF LEFT FEMALE BREAST, UNSPECIFIED ESTROGEN RECEPTOR STATUS, UNSPECIFIED SITE OF BREAST (HCC): Primary | ICD-10-CM

## 2023-03-21 DIAGNOSIS — Z01.818 PREOP TESTING: Primary | ICD-10-CM

## 2023-03-21 NOTE — PROGRESS NOTES
[facility]  Breast Cancer Education    Patient: Rukhsana Ng  YOB: 1975    HPI: Rukhsana Ng is a 41 y.o. female who presents today for breast cancer education. She was found to have left breast cancer and desires to proceed with unilateral vs bilateral mastectomy with reconstruction. Chief Complaint   Patient presents with    Other     Breast Cancer Education       Plan: Greater than 60 minutes was spent face to face with patient discussing preoperative and postoperative expectations, including wound care, surgical bra, drain care, medications, nutrition intake of increased protein and activity restrictions. Implant and tissue expander reconstruction was discussed , including TE fill timeline, implant surveillance and follow up. Noa Zaragoza wishes to proceed with a bilateral mastectomy with 1) Bilateral breast reconstruction with direct to implant reconstruction with Alloderm (possible Autoderm)  2) Possible stage 1 tissue expander placement with Alloderm. Surgery is scheduled 04/04/2023.       Gerda Holstein, BSN, RN [address] [place] [address] [facility] and Reconstructive Surgery  [phone]   03/21/2023

## 2023-03-21 NOTE — PATIENT INSTRUCTIONS
Sleep in an upright 45 degree position on your back for 6 weeks. You may use pillows to prop yourself up or use a recliner. DO NOT sleep on your stomach. No lifting, pushing or pulling greater than 5 pounds or repetitive upper arm movements. This includes     vacuuming, mopping, dishes, laundry, grocery shopping. Increase your protein intake to  grams of protein. You may use premixed protein shakes or     protein powders. No ice or heat should be applied to your breast under any circumstances. You may drive after your first postoperative appointment, as long as you are not taking narcotics or     Valium. Drain Care: You will have a drain from each breast after surgery. Please empty the drain twice a day. Record the     amount of drainage each time you empty the drain. Strip/ milk the drain to prevent the drain from     getting clogged. You will have the drains for a minimum of 2 weeks. The drains will be removed at the     end of 2 weeks when the drainage is 30 cc in 24 hours for 2 consecutive days. Drains should be     supported below the area of your breasts. Medications: You will receive an antibiotic to prevent infection. Pain medication prescription. You may supplement with Tylenol every 8 hours. Take a probiotic while taking the antibiotic. If you experience constipation, take a stool softener such as Colace. Things to call the office for:     Excessive swelling or feelings of engorgement in your breast.     Fever above 101F. A low grade fever is normal for a few days after surgery. Severe pain not relieved by Tylenol or prescribed pain medication. Foul smelling, thick or green drainage. Hives or rash. Concerns with your drains.

## 2023-03-24 ENCOUNTER — HOSPITAL ENCOUNTER (OUTPATIENT)
Age: 48
Discharge: HOME | End: 2023-03-24
Payer: COMMERCIAL

## 2023-03-24 DIAGNOSIS — Z01.818 PREOP TESTING: ICD-10-CM

## 2023-03-24 LAB
ALBUMIN SERPL-MCNC: 4.5 G/DL (ref 3.4–5)
ALBUMIN/GLOB SERPL: 1.3 {RATIO} (ref 1.1–2.2)
ALP SERPL-CCNC: 67 U/L (ref 40–129)
ALT SERPL-CCNC: 6 U/L (ref 10–40)
ANION GAP SERPL CALCULATED.3IONS-SCNC: 14 MMOL/L (ref 3–16)
APTT BLD: 31.9 SEC (ref 23–34.3)
AST SERPL-CCNC: 12 U/L (ref 15–37)
BASOPHILS # BLD: 0 K/UL (ref 0–0.2)
BASOPHILS NFR BLD: 0.5 %
BILIRUB SERPL-MCNC: 0.7 MG/DL (ref 0–1)
BUN SERPL-MCNC: 13 MG/DL (ref 7–20)
CALCIUM SERPL-MCNC: 9.4 MG/DL (ref 8.3–10.6)
CHLORIDE SERPL-SCNC: 104 MMOL/L (ref 99–110)
CO2 SERPL-SCNC: 21 MMOL/L (ref 21–32)
CREAT SERPL-MCNC: 0.7 MG/DL (ref 0.6–1.1)
DEPRECATED RDW RBC AUTO: 12.9 % (ref 12.4–15.4)
EOSINOPHIL # BLD: 0.1 K/UL (ref 0–0.6)
EOSINOPHIL NFR BLD: 0.8 %
GFR SERPLBLD CREATININE-BSD FMLA CKD-EPI: >60 ML/MIN/{1.73_M2}
GLUCOSE SERPL-MCNC: 86 MG/DL (ref 70–99)
HCT VFR BLD AUTO: 42.3 % (ref 36–48)
HGB BLD-MCNC: 14.1 G/DL (ref 12–16)
INR PPP: 1.05 (ref 0.87–1.14)
LYMPHOCYTES # BLD: 2.3 K/UL (ref 1–5.1)
LYMPHOCYTES NFR BLD: 31.6 %
MCH RBC QN AUTO: 30.6 PG (ref 26–34)
MCHC RBC AUTO-ENTMCNC: 33.3 G/DL (ref 31–36)
MCV RBC AUTO: 92 FL (ref 80–100)
MONOCYTES # BLD: 0.7 K/UL (ref 0–1.3)
MONOCYTES NFR BLD: 10.2 %
NEUTROPHILS # BLD: 4.1 K/UL (ref 1.7–7.7)
NEUTROPHILS NFR BLD: 56.9 %
PLATELET # BLD AUTO: 269 K/UL (ref 135–450)
PMV BLD AUTO: 9.5 FL (ref 5–10.5)
POTASSIUM SERPL-SCNC: 4.6 MMOL/L (ref 3.5–5.1)
PROT SERPL-MCNC: 8.1 G/DL (ref 6.4–8.2)
PROTHROMBIN TIME: 13.6 SEC (ref 11.7–14.5)
RBC # BLD AUTO: 4.6 M/UL (ref 4–5.2)
SODIUM SERPL-SCNC: 139 MMOL/L (ref 136–145)
WBC # BLD AUTO: 7.2 K/UL (ref 4–11)

## 2023-03-24 PROCEDURE — 80053 COMPREHEN METABOLIC PANEL: CPT

## 2023-03-24 PROCEDURE — 85730 THROMBOPLASTIN TIME PARTIAL: CPT

## 2023-03-24 PROCEDURE — 85025 COMPLETE CBC W/AUTO DIFF WBC: CPT

## 2023-03-24 PROCEDURE — 85610 PROTHROMBIN TIME: CPT

## 2023-03-24 PROCEDURE — 36415 COLL VENOUS BLD VENIPUNCTURE: CPT

## 2023-04-04 ENCOUNTER — ANESTHESIA (OUTPATIENT)
Dept: OPERATING ROOM | Age: 48
End: 2023-04-04
Payer: COMMERCIAL

## 2023-04-04 ENCOUNTER — ANESTHESIA EVENT (OUTPATIENT)
Dept: OPERATING ROOM | Age: 48
End: 2023-04-04
Payer: COMMERCIAL

## 2023-04-04 ENCOUNTER — HOSPITAL ENCOUNTER (OUTPATIENT)
Dept: NUCLEAR MEDICINE | Age: 48
Discharge: HOME | End: 2023-04-04
Payer: COMMERCIAL

## 2023-04-04 ENCOUNTER — HOSPITAL ENCOUNTER (OUTPATIENT)
Age: 48
Setting detail: OUTPATIENT SURGERY
Discharge: HOME | End: 2023-04-04
Attending: SURGERY | Admitting: SURGERY
Payer: COMMERCIAL

## 2023-04-04 VITALS
SYSTOLIC BLOOD PRESSURE: 116 MMHG | HEART RATE: 64 BPM | BODY MASS INDEX: 31.13 KG/M2 | WEIGHT: 229.8 LBS | OXYGEN SATURATION: 98 % | RESPIRATION RATE: 14 BRPM | TEMPERATURE: 98 F | DIASTOLIC BLOOD PRESSURE: 84 MMHG | HEIGHT: 72 IN

## 2023-04-04 DIAGNOSIS — C50.912 PRIMARY BREAST MALIGNANCY, LEFT (HCC): ICD-10-CM

## 2023-04-04 DIAGNOSIS — G89.18 POST-OP PAIN: Primary | ICD-10-CM

## 2023-04-04 DIAGNOSIS — C50.912 MALIGNANT NEOPLASM OF LEFT FEMALE BREAST, UNSPECIFIED ESTROGEN RECEPTOR STATUS, UNSPECIFIED SITE OF BREAST (HCC): ICD-10-CM

## 2023-04-04 LAB
ABO + RH BLD: NORMAL
BLD GP AB SCN SERPL QL: NORMAL
HCG UR QL: NEGATIVE

## 2023-04-04 PROCEDURE — 38525 BIOPSY/REMOVAL LYMPH NODES: CPT | Performed by: SURGERY

## 2023-04-04 PROCEDURE — 7100000001 HC PACU RECOVERY - ADDTL 15 MIN: Performed by: SURGERY

## 2023-04-04 PROCEDURE — 2720000010 HC SURG SUPPLY STERILE: Performed by: SURGERY

## 2023-04-04 PROCEDURE — 7100000011 HC PHASE II RECOVERY - ADDTL 15 MIN: Performed by: SURGERY

## 2023-04-04 PROCEDURE — 19303 MAST SIMPLE COMPLETE: CPT | Performed by: SURGERY

## 2023-04-04 PROCEDURE — 3700000000 HC ANESTHESIA ATTENDED CARE: Performed by: SURGERY

## 2023-04-04 PROCEDURE — 38792 RA TRACER ID OF SENTINL NODE: CPT | Performed by: SURGERY

## 2023-04-04 PROCEDURE — 2580000003 HC RX 258: Performed by: SURGERY

## 2023-04-04 PROCEDURE — 88307 TISSUE EXAM BY PATHOLOGIST: CPT

## 2023-04-04 PROCEDURE — 7100000010 HC PHASE II RECOVERY - FIRST 15 MIN: Performed by: SURGERY

## 2023-04-04 PROCEDURE — 2500000003 HC RX 250 WO HCPCS: Performed by: SURGERY

## 2023-04-04 PROCEDURE — 3430000000 HC RX DIAGNOSTIC RADIOPHARMACEUTICAL: Performed by: SURGERY

## 2023-04-04 PROCEDURE — C1729 CATH, DRAINAGE: HCPCS | Performed by: SURGERY

## 2023-04-04 PROCEDURE — 84703 CHORIONIC GONADOTROPIN ASSAY: CPT

## 2023-04-04 PROCEDURE — 15860 IV NJX TST VASC FLO FLAP/GRF: CPT | Performed by: SURGERY

## 2023-04-04 PROCEDURE — 6360000002 HC RX W HCPCS: Performed by: SURGERY

## 2023-04-04 PROCEDURE — 3600000004 HC SURGERY LEVEL 4 BASE: Performed by: SURGERY

## 2023-04-04 PROCEDURE — 2580000003 HC RX 258: Performed by: ANESTHESIOLOGY

## 2023-04-04 PROCEDURE — A4217 STERILE WATER/SALINE, 500 ML: HCPCS | Performed by: SURGERY

## 2023-04-04 PROCEDURE — 2500000003 HC RX 250 WO HCPCS: Performed by: NURSE ANESTHETIST, CERTIFIED REGISTERED

## 2023-04-04 PROCEDURE — 3600000014 HC SURGERY LEVEL 4 ADDTL 15MIN: Performed by: SURGERY

## 2023-04-04 PROCEDURE — 78195 LYMPH SYSTEM IMAGING: CPT

## 2023-04-04 PROCEDURE — 86901 BLOOD TYPING SEROLOGIC RH(D): CPT

## 2023-04-04 PROCEDURE — 2709999900 HC NON-CHARGEABLE SUPPLY: Performed by: SURGERY

## 2023-04-04 PROCEDURE — 88342 IMHCHEM/IMCYTCHM 1ST ANTB: CPT

## 2023-04-04 PROCEDURE — C9290 INJ, BUPIVACAINE LIPOSOME: HCPCS | Performed by: SURGERY

## 2023-04-04 PROCEDURE — A9520 TC99 TILMANOCEPT DIAG 0.5MCI: HCPCS | Performed by: SURGERY

## 2023-04-04 PROCEDURE — 6360000002 HC RX W HCPCS: Performed by: NURSE ANESTHETIST, CERTIFIED REGISTERED

## 2023-04-04 PROCEDURE — 3700000001 HC ADD 15 MINUTES (ANESTHESIA): Performed by: SURGERY

## 2023-04-04 PROCEDURE — C1889 IMPLANT/INSERT DEVICE, NOC: HCPCS | Performed by: SURGERY

## 2023-04-04 PROCEDURE — 7100000000 HC PACU RECOVERY - FIRST 15 MIN: Performed by: SURGERY

## 2023-04-04 PROCEDURE — 15777 ACELLULAR DERM MATRIX IMPLT: CPT | Performed by: SURGERY

## 2023-04-04 PROCEDURE — 86850 RBC ANTIBODY SCREEN: CPT

## 2023-04-04 PROCEDURE — 38900 IO MAP OF SENT LYMPH NODE: CPT | Performed by: SURGERY

## 2023-04-04 PROCEDURE — 19357 TISS XPNDR PLMT BRST RCNSTJ: CPT | Performed by: SURGERY

## 2023-04-04 PROCEDURE — 86900 BLOOD TYPING SEROLOGIC ABO: CPT

## 2023-04-04 DEVICE — GRAFT HUM TISS THK2-2.8MM THCK L PERF CNTOUR ACELLULAR DERM: Type: IMPLANTABLE DEVICE | Site: BREAST | Status: FUNCTIONAL

## 2023-04-04 DEVICE — BREAST TISSUE EXPANDER, SUTURE TABS, INTEGRAL INJECTION DOME, 650CC
Type: IMPLANTABLE DEVICE | Site: BREAST | Status: FUNCTIONAL
Brand: MENTOR ARTOURA PLUS, SMOOTH, ULTRA HIGH PROFILE

## 2023-04-04 RX ORDER — ONDANSETRON 2 MG/ML
INJECTION INTRAMUSCULAR; INTRAVENOUS PRN
Status: DISCONTINUED | OUTPATIENT
Start: 2023-04-04 | End: 2023-04-04 | Stop reason: SDUPTHER

## 2023-04-04 RX ORDER — SODIUM CHLORIDE 0.9 % (FLUSH) 0.9 %
5-40 SYRINGE (ML) INJECTION EVERY 12 HOURS SCHEDULED
Status: DISCONTINUED | OUTPATIENT
Start: 2023-04-04 | End: 2023-04-04 | Stop reason: HOSPADM

## 2023-04-04 RX ORDER — OXYCODONE HYDROCHLORIDE 5 MG/1
5 TABLET ORAL EVERY 6 HOURS PRN
Qty: 28 TABLET | Refills: 0 | Status: SHIPPED | OUTPATIENT
Start: 2023-04-04 | End: 2023-04-11

## 2023-04-04 RX ORDER — DOCUSATE SODIUM 100 MG/1
100 CAPSULE, LIQUID FILLED ORAL 2 TIMES DAILY PRN
Qty: 28 CAPSULE | Refills: 0 | Status: SHIPPED | OUTPATIENT
Start: 2023-04-04 | End: 2023-04-18

## 2023-04-04 RX ORDER — MIDAZOLAM HYDROCHLORIDE 1 MG/ML
INJECTION INTRAMUSCULAR; INTRAVENOUS PRN
Status: DISCONTINUED | OUTPATIENT
Start: 2023-04-04 | End: 2023-04-04 | Stop reason: SDUPTHER

## 2023-04-04 RX ORDER — PROPOFOL 10 MG/ML
INJECTION, EMULSION INTRAVENOUS PRN
Status: DISCONTINUED | OUTPATIENT
Start: 2023-04-04 | End: 2023-04-04 | Stop reason: SDUPTHER

## 2023-04-04 RX ORDER — FAMOTIDINE 10 MG/ML
INJECTION, SOLUTION INTRAVENOUS PRN
Status: DISCONTINUED | OUTPATIENT
Start: 2023-04-04 | End: 2023-04-04 | Stop reason: SDUPTHER

## 2023-04-04 RX ORDER — CEPHALEXIN 500 MG/1
500 CAPSULE ORAL 4 TIMES DAILY
Qty: 40 CAPSULE | Refills: 0 | Status: SHIPPED | OUTPATIENT
Start: 2023-04-04 | End: 2023-04-14

## 2023-04-04 RX ORDER — ISOSULFAN BLUE 50 MG/5ML
INJECTION, SOLUTION SUBCUTANEOUS PRN
Status: DISCONTINUED | OUTPATIENT
Start: 2023-04-04 | End: 2023-04-04 | Stop reason: HOSPADM

## 2023-04-04 RX ORDER — SODIUM CHLORIDE 0.9 % (FLUSH) 0.9 %
5-40 SYRINGE (ML) INJECTION PRN
Status: DISCONTINUED | OUTPATIENT
Start: 2023-04-04 | End: 2023-04-04 | Stop reason: HOSPADM

## 2023-04-04 RX ORDER — LIDOCAINE HYDROCHLORIDE 10 MG/ML
1 INJECTION, SOLUTION EPIDURAL; INFILTRATION; INTRACAUDAL; PERINEURAL
Status: DISCONTINUED | OUTPATIENT
Start: 2023-04-04 | End: 2023-04-04 | Stop reason: HOSPADM

## 2023-04-04 RX ORDER — FENTANYL CITRATE 50 UG/ML
INJECTION, SOLUTION INTRAMUSCULAR; INTRAVENOUS PRN
Status: DISCONTINUED | OUTPATIENT
Start: 2023-04-04 | End: 2023-04-04 | Stop reason: SDUPTHER

## 2023-04-04 RX ORDER — HYDROMORPHONE HCL 110MG/55ML
PATIENT CONTROLLED ANALGESIA SYRINGE INTRAVENOUS PRN
Status: DISCONTINUED | OUTPATIENT
Start: 2023-04-04 | End: 2023-04-04 | Stop reason: SDUPTHER

## 2023-04-04 RX ORDER — LIDOCAINE HYDROCHLORIDE 20 MG/ML
INJECTION, SOLUTION INTRAVENOUS PRN
Status: DISCONTINUED | OUTPATIENT
Start: 2023-04-04 | End: 2023-04-04 | Stop reason: SDUPTHER

## 2023-04-04 RX ORDER — SODIUM CHLORIDE, SODIUM LACTATE, POTASSIUM CHLORIDE, CALCIUM CHLORIDE 600; 310; 30; 20 MG/100ML; MG/100ML; MG/100ML; MG/100ML
INJECTION, SOLUTION INTRAVENOUS CONTINUOUS
Status: DISCONTINUED | OUTPATIENT
Start: 2023-04-04 | End: 2023-04-04 | Stop reason: HOSPADM

## 2023-04-04 RX ORDER — SODIUM CHLORIDE 9 MG/ML
INJECTION, SOLUTION INTRAVENOUS PRN
Status: DISCONTINUED | OUTPATIENT
Start: 2023-04-04 | End: 2023-04-04 | Stop reason: HOSPADM

## 2023-04-04 RX ORDER — MAGNESIUM HYDROXIDE 1200 MG/15ML
LIQUID ORAL CONTINUOUS PRN
Status: DISCONTINUED | OUTPATIENT
Start: 2023-04-04 | End: 2023-04-04 | Stop reason: HOSPADM

## 2023-04-04 RX ORDER — ROCURONIUM BROMIDE 10 MG/ML
INJECTION, SOLUTION INTRAVENOUS PRN
Status: DISCONTINUED | OUTPATIENT
Start: 2023-04-04 | End: 2023-04-04 | Stop reason: SDUPTHER

## 2023-04-04 RX ADMIN — HYDROMORPHONE HYDROCHLORIDE 0.4 MG: 2 INJECTION, SOLUTION INTRAMUSCULAR; INTRAVENOUS; SUBCUTANEOUS at 09:22

## 2023-04-04 RX ADMIN — LIDOCAINE HYDROCHLORIDE 50 MG: 20 INJECTION, SOLUTION INTRAVENOUS at 08:37

## 2023-04-04 RX ADMIN — SODIUM CHLORIDE, POTASSIUM CHLORIDE, SODIUM LACTATE AND CALCIUM CHLORIDE: 600; 310; 30; 20 INJECTION, SOLUTION INTRAVENOUS at 13:18

## 2023-04-04 RX ADMIN — TILMANOCEPT 0.81 MILLICURIE: KIT at 09:09

## 2023-04-04 RX ADMIN — SUGAMMADEX 200 MG: 100 INJECTION, SOLUTION INTRAVENOUS at 13:22

## 2023-04-04 RX ADMIN — SODIUM CHLORIDE, POTASSIUM CHLORIDE, SODIUM LACTATE AND CALCIUM CHLORIDE: 600; 310; 30; 20 INJECTION, SOLUTION INTRAVENOUS at 07:41

## 2023-04-04 RX ADMIN — ROCURONIUM BROMIDE 50 MG: 10 INJECTION INTRAVENOUS at 08:37

## 2023-04-04 RX ADMIN — ONDANSETRON 4 MG: 2 INJECTION INTRAMUSCULAR; INTRAVENOUS at 08:43

## 2023-04-04 RX ADMIN — FENTANYL CITRATE 100 MCG: 50 INJECTION, SOLUTION INTRAMUSCULAR; INTRAVENOUS at 08:36

## 2023-04-04 RX ADMIN — CEFAZOLIN 2000 MG: 2 INJECTION, POWDER, FOR SOLUTION INTRAMUSCULAR; INTRAVENOUS at 08:43

## 2023-04-04 RX ADMIN — ROCURONIUM BROMIDE 20 MG: 10 INJECTION INTRAVENOUS at 11:45

## 2023-04-04 RX ADMIN — HYDROMORPHONE HYDROCHLORIDE 0.6 MG: 2 INJECTION, SOLUTION INTRAMUSCULAR; INTRAVENOUS; SUBCUTANEOUS at 09:10

## 2023-04-04 RX ADMIN — MIDAZOLAM HYDROCHLORIDE 2 MG: 2 INJECTION, SOLUTION INTRAMUSCULAR; INTRAVENOUS at 08:34

## 2023-04-04 RX ADMIN — SODIUM CHLORIDE, POTASSIUM CHLORIDE, SODIUM LACTATE AND CALCIUM CHLORIDE: 600; 310; 30; 20 INJECTION, SOLUTION INTRAVENOUS at 09:54

## 2023-04-04 RX ADMIN — CEFAZOLIN 2000 MG: 2 INJECTION, POWDER, FOR SOLUTION INTRAMUSCULAR; INTRAVENOUS at 12:43

## 2023-04-04 RX ADMIN — PROPOFOL 200 MG: 10 INJECTION, EMULSION INTRAVENOUS at 08:37

## 2023-04-04 RX ADMIN — HYDROMORPHONE HYDROCHLORIDE 0.4 MG: 2 INJECTION, SOLUTION INTRAMUSCULAR; INTRAVENOUS; SUBCUTANEOUS at 12:26

## 2023-04-04 RX ADMIN — ROCURONIUM BROMIDE 30 MG: 10 INJECTION INTRAVENOUS at 12:08

## 2023-04-04 RX ADMIN — TRANEXAMIC ACID 1000 MG: 100 INJECTION, SOLUTION INTRAVENOUS at 09:21

## 2023-04-04 RX ADMIN — ROCURONIUM BROMIDE 50 MG: 10 INJECTION INTRAVENOUS at 09:09

## 2023-04-04 RX ADMIN — FAMOTIDINE 20 MG: 10 INJECTION, SOLUTION INTRAVENOUS at 08:36

## 2023-04-04 ASSESSMENT — PAIN SCALES - GENERAL
PAINLEVEL_OUTOF10: 2
PAINLEVEL_OUTOF10: 0

## 2023-04-04 ASSESSMENT — PAIN DESCRIPTION - ORIENTATION: ORIENTATION: RIGHT;LEFT

## 2023-04-04 ASSESSMENT — PAIN DESCRIPTION - FREQUENCY: FREQUENCY: CONTINUOUS

## 2023-04-04 ASSESSMENT — PAIN DESCRIPTION - PAIN TYPE: TYPE: SURGICAL PAIN

## 2023-04-04 ASSESSMENT — PAIN DESCRIPTION - LOCATION: LOCATION: BREAST

## 2023-04-04 ASSESSMENT — PAIN DESCRIPTION - DESCRIPTORS: DESCRIPTORS: DISCOMFORT;SORE

## 2023-04-04 NOTE — PROGRESS NOTES
Ambulatory Surgery/Procedure Discharge Note    Vitals:    04/04/23 1439   BP: 116/84   Pulse: 64   Resp: 14   Temp: 98 °F (36.7 °C)   SpO2: 98%       In: 2640 [P.O.:60; I.V.:2420]  Out: 580 [Urine:500; Drains:80]    Restroom use offered before discharge. Yes    Pain assessment:  none and present - adequately treated  Pain Level: 2    Pt and  states \"ready to go home\". Pt alert and oriented x4. IV removed. Denies N/V or pain. Bilateral DAVID drain dressings-C,D,I. Surgical bra on with fluff. Discharge instructions given to pt and  with pt permission. Pt and  verbalized understanding of all instructions. Left with all belongings, 3 prescriptions, extra supplies and discharge instructions. Patient discharged to home/self care. Patient discharged via wheel chair by transporter to waiting family.        4/4/2023 3:48 PM
Called and updated  Marcelo Patiño on phone- updated. Patient taking sips of water - no complaints of nausea. VS stable.
PACU Transfer to [facility]    Vitals:    04/04/23 1415   BP: 129/86   Pulse: 64   Resp: 14   Temp: 97.5 °F (36.4 °C)   SpO2: 98%         Intake/Output Summary (Last 24 hours) at 4/4/2023 1437  Last data filed at 4/4/2023 1345  Gross per 24 hour   Intake 2580 ml   Output 580 ml   Net 2000 ml       Pain assessment:  none VS stable. No pain no nausea. Patient to [facility] bed #6 for discharge home.    Pain Level: 0    Patient transferred to care of [facility] RN.    4/4/2023 2:37 PM
Patient admitted to PACU. Post op   Date: 04/04/23 Room / Location: [address] [place] [address] / [facility]   Anesthesia Start: 7125 Anesthesia Stop: 7588   Procedures:        BILATERAL SKIN SPARING MASTECTOMY, LEFT SENTINEL LYMPH NODE BIOPSY (Bilateral: Breast)       BILATERAL BREAST RECONSTRUCTION WITH  STAGE 1 TISSUE EXPANDER PLACEMENT WITH ALLODERM (Bilateral: Breast) Diagnosis:        Malignant neoplasm of left female breast, unspecified estrogen receptor status, unspecified site of breast ([facility] [address]       (Malignant neoplasm of left female breast, unspecified estrogen receptor status, unspecified site of breast ([facility] [address] Francisco Javier Jennings)   Surgeons: Shannen Ornelas MD; Marni Singleton MD Responsible Provider: eF Hinojosa MD   Report received from [facility] at bedside. Report received from surgical resident Dr. Adrinaa Vega. Patient awake denies any pain or nausea. Bilateral breast with incisions intact, gauze fluff and surgical bra on. Good radial pulses. DAVID drain to each left and right breast compressed with bloody drainage noted.
Patient alert and oriented x4. IV placed and IV fluids infusing. Consents signed and placed on chart.
Place patient label inside box (if no patient label, complete below)  Name:  :  MR#:     Josph Dakins / PROCEDURE  I (we), Bernardo Alejandra (Patient Name) authorize  DR Cr Parr (Provider / Olivier Stack) and/or such assistants as may be selected by him/her, to perform the following operation/procedure(s): BILATERAL BREAST RECONSTRUCTION WITH DIRECT TO IMPLANT RECONSTRUCTION WITH ALLODERM, POSSIBLE AUTODERM, POSSIBLE STAGE 1 TISSUE EXPANDER PLACEMENT WITH ALLODERM       Note: If unable to obtain consent prior to an emergent procedure, document the emergent reason in the medical record. This procedure has been explained to my (our) satisfaction and included in the explanation was: The intended benefit, nature, and extent of the procedure to be performed; The significant risks involved and the probability of success; Alternative procedures and methods of treatment; The dangers and probable consequences of such alternatives (including no procedure or treatment); The expected consequences of the procedure on my future health; Whether other qualified individuals would be performing important surgical tasks and/or whether  would be present to advise or support the procedure. I (we) understand that there are other risks of infection and other serious complications in the pre-operative/procedural and postoperative/procedural stages of my (our) care. I (we) have asked all of the questions which I (we) thought were important in deciding whether or not to undergo treatment or diagnosis. These questions have been answered to my (our) satisfaction. I (we) understand that no assurance can be given that the procedure will be a success, and no guarantee or warranty of success has been given to me (us).     It has been explained to me (us) that during the course of the operation/procedure, unforeseen conditions may be revealed that necessitate
Paragraph 1. I (we) authorize and request that the above-named physician, his/her assistants or his/her designees, perform procedures as necessary and desirable if deemed to be in my (our) best interest.     Revised 8/2/2021                                                                          Page 1 of 2           I acknowledge that health care personnel may be observing this procedure for the purpose of medical education or other specified purposes as may be necessary as requested and/or approved by my (our) physician. I (we) consent to the disposal by the hospital Pathologist of the removed tissue, parts or organs in accordance with hospital policy. I do ____ do not ____ consent to the use of a local infiltration pain blocking agent that will be used by my provider/surgical provider to help alleviate pain during my procedure. I do ____ do not ____ consent to an emergent blood transfusion in the case of a life-threatening situation that requires blood components to be administered. This consent is valid for 24 hours from the beginning of the procedure. This patient does ____ or does not ____ currently have a DNR status/order. If DNR order is in place, obtain Addendum to the Surgical Consent for ALL Patients with a DNR Order to address mar-operative status for limited intervention or DNR suspension.      I have read and fully understand the above Consent for Operation/Procedure and that all blanks were completed before I signed the consent.   _____________________________       _____________________      ____/____am/pm  Signature of Patient or legal representative      Printed Name / Relationship            Date / Time   ____________________________       _____________________      ____/____am/pm  Witness to Signature                                    Printed Name                    Date / Time    If patient is unable to sign or is a minor, complete the following)  Patient is a minor, ____ years
with you on the day of your procedure. 10. We recommend that valuable personal belongings such as cash, cell phones, e-tablets, or jewelry, be left at home during your stay. The hospital will not be responsible for valuables that are not secured in the hospital safe. However, if your insurance requires a co-pay, you may want to bring a method of payment, i.e., Check or credit card, if you wish to pay your co-pay the day of surgery. 11. If you are to stay overnight, you may bring a bag with personal items. Please have any large items you may need brought in by your family after your arrival to your hospital room. 12. If you have a Living Will or Durable Power of , please bring a copy on the day of your procedure. How we keep you safe and work to prevent surgical site infections:   1. Health care workers should always check your ID bracelet to verify your name and birth date. You will be asked many times to state your name, date of birth, and allergies. 2. Health care workers should always clean their hands with soap or alcohol gel before providing care to you. It is okay to ask anyone if they cleaned their hands before they touch you. 3. You will be actively involved in verifying the type of procedure you are having and ensuring the correct surgical site. This will be confirmed multiple times prior to your procedure. Do NOT katty your surgery site UNLESS instructed to by your surgeon. 4. When you are in the operating room, your surgical site will be cleansed with a special soap, and in most cases, you will be given an antibiotic before the surgery begins. What to expect AFTER your procedure? 1. Immediately following your procedure, your will be taken to the PACU for the first phase of your recovery. Your nurse will help you recover from any potential side effects of anesthesia, such as extreme drowsiness, changes in your vital signs or breathing patterns.  Nausea, headache,

## 2023-04-04 NOTE — ANESTHESIA PRE PROCEDURE
04/04/2023        ABGs: No results found for: PHART, PO2ART, FJE5MTE, WYS8WVN, BEART, Y4OPGDBV     Type & Screen (If Applicable):  Lab Results   Component Value Date    LABABO O 04/27/2011    LABRH Positive 04/27/2011       Drug/Infectious Status (If Applicable):  No results found for: HIV, HEPCAB    COVID-19 Screening (If Applicable):   Lab Results   Component Value Date/Time    COVID19 Not Detected 08/13/2020 08:00 AM           Anesthesia Evaluation  Patient summary reviewed  Airway: Mallampati: Unable to assess / NA  TM distance: >3 FB   Neck ROM: full  Mouth opening: > = 3 FB   Dental: normal exam         Pulmonary:Negative Pulmonary ROS and normal exam                               Cardiovascular:  Exercise tolerance: good (>4 METS),   (+) hypertension:, dysrhythmias (S/P ablation): SVT,                ROS comment: Echo 1/19/23 55%     Neuro/Psych:   Negative Neuro/Psych ROS              GI/Hepatic/Renal: Neg GI/Hepatic/Renal ROS            Endo/Other:    (+) malignancy/cancer (Breast). Abdominal:             Vascular: negative vascular ROS. Other Findings:           Anesthesia Plan      general     ASA 3       Induction: intravenous. Anesthetic plan and risks discussed with patient.         Attending anesthesiologist reviewed and agrees with Sandra Saldaña MD   4/4/2023

## 2023-04-04 NOTE — BRIEF OP NOTE
[address] PERF CNTOUR ACELLULAR DERM - [id number]  GRAFT HUM TISS THK2-2.8MM [address] [employer] [id number] Right 1 Implanted         Drains:   Closed/Suction Drain Left Breast Bulb (Active)   Dressing Status Other (Comment) 04/04/23 1318       Closed/Suction Drain Right Breast Bulb (Active)   Dressing Status Other (Comment) 04/04/23 1317       Urinary Catheter 04/04/23 [place] (Active)       Findings: Stage 1 reconstruction with tissue expander placement and alloderm, drains bilaterally with dark SS output, surgical bra with fluffs in place, Perineo used for surgical dressing.      Electronically signed by Red Barbosa DO on 4/4/2023 at 1:27 PM

## 2023-04-04 NOTE — INTERVAL H&P NOTE
H&P Update    The patient's most recent H&P, office notes, breast imaging, and pathology were reviewed. Patient examined and laterality marked. There has been no changes. We will plan to proceed with a bilateral mastectomy with left sentinel lymph node biopsy.     Thereasa MD Yamila

## 2023-04-04 NOTE — DISCHARGE INSTRUCTIONS
have excessive bleeding or drainage from the wound. If you notice this, please call the office immediately. Please call the office if you notice a fever as this may be a sign of infection. If you had surgical drains placed, empty them 2-3 times a day or when the bulb is full. Please record the amount drained and color daily. This is used to identify when they can be removed in the office. A home care nurse may be assigned to check in on your progress. Activity  You may resume most daily activities the day after surgery  Avoid strenuous activity, heavy lifting (5-7 pounds), and vigorous exercise until seen at your postoperative appointment  Walking is in normal activity that can be restarted right away  Avoid any direct trauma to the surgical area  You may resume driving when you are no longer taking narcotics, pain free, and you feel safe turning the wheel and stopping quickly  Everyone recovers at different paces. You may be able to return to work in the next one to several weeks. Most people return to work in 2-4 weeks, however, this depends on your type of work and overall health. For patients undergoing mastectomy and/or axillary dissection, you may begin arm exercises that after surgery. It is important to start slowly and gradually increase your activity. You may be referred to physical therapy for additional exercises. If you have a drain in place, be cautious of this and only perform light activity. If you had reconstructive surgery, please discuss activity limitations with your plastic surgeon    Diet  You may encounter nausea following surgery. Drink clear liquids or popsicles until you are feeling better. You have no diet restrictions and may resume a regular healthy diet immediately. You may add fiber or any over the counter stool softener/laxative to your diet to help with constipation incurred by narcotic pain medications. You can resume all of your regular medications immediately.  [place]

## 2023-04-04 NOTE — ANESTHESIA POSTPROCEDURE EVALUATION
Department of Anesthesiology  Postprocedure Note    Patient: Isatu Santo  MRN: [id number]  YOB: 1975  Date of evaluation: 4/4/2023      Procedure Summary     Date: 04/04/23 Room / Location: [address] [place] [address]    Anesthesia Start: 1417 Anesthesia Stop: 7557    Procedures:       BILATERAL SKIN SPARING MASTECTOMY, LEFT SENTINEL LYMPH NODE BIOPSY (Bilateral: Breast)      BILATERAL BREAST RECONSTRUCTION WITH  STAGE 1 TISSUE EXPANDER PLACEMENT WITH ALLODERM (Bilateral: Breast) Diagnosis:       Malignant neoplasm of left female breast, unspecified estrogen receptor status, unspecified site of breast ([facility] [address]      (Malignant neoplasm of left female breast, unspecified estrogen receptor status, unspecified site of breast ([facility] [address] [C50.912])    Surgeons: Samira Sifuentes MD; Anisha Vang MD Responsible Provider: Michele Beyer MD    Anesthesia Type: General ASA Status: 3          Anesthesia Type: General    Radha Phase I: Radha Score: 9    Radha Phase II: Radha Score: 10      Anesthesia Post Evaluation    Patient location during evaluation: PACU  Level of consciousness: awake  Complications: no  Multimodal analgesia pain management approach

## 2023-04-04 NOTE — OP NOTE
was noted to be at level 2 and excised from the surrounding subcutaneous tissues using blunt dissection and electrocautery. Small lymphatics and vascular structures were identified and controlled with electrocautery and surgical clips. Ex-vivo counts of sentinel node 1 were 88428, it was blue, and was palpable. There were no additional sentinel nodes identified. The node was placed into a container, labeled, and submitted to pathology. Basin counts following removal of the final sentinel node were 48. The axilla was then assessed for other blue channels/nodes and palpably abnormal lymph nodes. Blue nodes, non colored nodes extending from dye filled channels, radioactive and palpably suspicious nodes were removed. The axilla was then assessed for hemostasis. Cautery, surgical clips, and hemostatic agents were utilized as necessary. The wound was irrigated. Exparel was utilized. Once hemostasis was achieved the chest wall was covered in a sterile fashion. At this point the procedure was taken over by Dr. Mitzi Castellanos of plastic surgery to proceed with stage I reconstruction. The contralateral breast was then removed in a similar fashion as the left. Points noted above:  Operation performed with curative intent: Yes  Tracer(s) used to identify sentinel nodes in the upfront surgery (nonneoadjuvant) setting (select all that apply) : Dye and Radioactive tracer  Tracer(s) used to identify sentinel nodes in the neoadjuvant setting (select all that apply): Not Applicable  All nodes (colored or noncolored) present at the end of a dye-filled lymphatic channel were removed: Yes  All significantly radioactive nodes were removed:  Yes  All palpably suspicious nodes were removed: Yes  Biopsy-proven positive nodes marked with clips prior to chemotherapy were identified and removed: Not Applicable    The complexity of this case was above usual due to small cosmetically appealing incisions, bleeding management, the need for

## 2023-04-04 NOTE — OP NOTE
counts were correct and there were no immediate complications. The patient tolerated the procedure well. DRAINS: 2 (15F in each breast)    SPECIMEN: None from plastics    CONDITION: Stable    MASTECTOMY SPECIMEN WEIGHT: R 522 grams; L 598 grams    IMPLANTS:   (Right) 650 mL [place] Ultra High Profile, reference# SDC-[id number], lot# [id number]. (Right)  Alloderm RTU, contour, perforated; size (328 cm^2); ref# [id number], lot# [id number] & [id number]. (Left)   650 mL [place] [employer] Ultra High Profile, reference# SDC-130RUH, lot# [id number]. (Left)   Alloderm RTU, contour, perforated; size (328 cm^2)); ref# [id number], lot# [id number] & [id number].     Galindo Owens MD

## 2023-04-06 ENCOUNTER — TELEPHONE (OUTPATIENT)
Dept: SURGERY | Age: 48
End: 2023-04-06

## 2023-04-17 ENCOUNTER — OFFICE VISIT (OUTPATIENT)
Dept: SURGERY | Age: 48
End: 2023-04-17

## 2023-04-17 VITALS
WEIGHT: 225 LBS | SYSTOLIC BLOOD PRESSURE: 121 MMHG | BODY MASS INDEX: 30.48 KG/M2 | RESPIRATION RATE: 18 BRPM | OXYGEN SATURATION: 97 % | HEIGHT: 72 IN | DIASTOLIC BLOOD PRESSURE: 82 MMHG | HEART RATE: 64 BPM

## 2023-04-17 DIAGNOSIS — Z09 POSTOP CHECK: Primary | ICD-10-CM

## 2023-04-17 DIAGNOSIS — C50.912 PRIMARY BREAST MALIGNANCY, LEFT (HCC): ICD-10-CM

## 2023-04-17 PROCEDURE — 99024 POSTOP FOLLOW-UP VISIT: CPT | Performed by: SURGERY

## 2023-04-18 ENCOUNTER — OFFICE VISIT (OUTPATIENT)
Dept: SURGERY | Age: 48
End: 2023-04-18

## 2023-04-18 VITALS
OXYGEN SATURATION: 98 % | WEIGHT: 225 LBS | HEART RATE: 70 BPM | DIASTOLIC BLOOD PRESSURE: 89 MMHG | TEMPERATURE: 98.2 F | BODY MASS INDEX: 30.52 KG/M2 | SYSTOLIC BLOOD PRESSURE: 123 MMHG

## 2023-04-18 DIAGNOSIS — Z09 POSTOP CHECK: Primary | ICD-10-CM

## 2023-04-18 PROCEDURE — 99024 POSTOP FOLLOW-UP VISIT: CPT

## 2023-04-18 NOTE — PROGRESS NOTES
[facility]    PROCEDURE: 1) Immediate bilateralstage I breast reconstruction with tissue expander placement                          2) Insertion of Alloderm Acellular Dermal Matrix (328 cm^2))   3) Intraoperative SPY fluorescent angiography                            DATE: 4/4/23    Joshua Torres has been recovering well since her procedure. Pain has been well controlled with the prescribed pain management regimen. Patient presents today for bilateral drain removal.     EXAM    /89   Pulse 70   Temp 98.2 °F (36.8 °C)   Wt 225 lb (102.1 kg)   LMP 03/29/2023 Comment: urine pregnancy test negative  SpO2 98%   BMI 30.52 kg/m²       GEN: NAD   BREAST: Incision site healing appropriately. No hematoma/seroma. Drains serosang. IMP: 41 y. o.female s/p B TE   PLAN: Doing well overall. Bilateral drain removed without difficulty. Will follow up next week to begin TE fills. Will call with any additional concerns in the interim.      Effie Olivas, APRN - [id number] [facility]  [phone]  04/18/23

## 2023-04-27 ENCOUNTER — OFFICE VISIT (OUTPATIENT)
Dept: SURGERY | Age: 48
End: 2023-04-27

## 2023-04-27 VITALS
WEIGHT: 225 LBS | SYSTOLIC BLOOD PRESSURE: 126 MMHG | HEART RATE: 75 BPM | DIASTOLIC BLOOD PRESSURE: 86 MMHG | BODY MASS INDEX: 30.52 KG/M2 | OXYGEN SATURATION: 98 % | TEMPERATURE: 98.7 F

## 2023-04-27 DIAGNOSIS — Z09 POSTOP CHECK: Primary | ICD-10-CM

## 2023-04-27 PROCEDURE — 99024 POSTOP FOLLOW-UP VISIT: CPT

## 2023-04-27 NOTE — PROGRESS NOTES
[facility]    PROCEDURE: 1) Immediate bilateralstage I breast reconstruction with tissue expander placement                          2) Insertion of Alloderm Acellular Dermal Matrix (328 cm^2))   3) Intraoperative SPY fluorescent angiography                            DATE: 4/4/23    Annika Shah has been recovering well since her procedure. Pain has been well controlled with the prescribed pain management regimen. Patient presents today to initiate TE fill. EXAM    /86   Pulse 75   Temp 98.7 °F (37.1 °C)   Wt 225 lb (102.1 kg)   SpO2 98%   BMI 30.52 kg/m²       GEN: NAD   BREAST: Incision site healing appropriately. No hematoma/seroma. IMP: 41 y. o.female s/p B TE   PLAN: Doing well overall. A total of 650 ml of air was removed from bilateral expanders then 200 ml of sterile saline was placed into the expanders bringing the total to 200 ml (650 expanders). Will follow up in 2 weeks for additional  TE fill. Will call with any concerns in the interim.      Ludivina Salcedo, APRN - [id number] [facility] Reconstructive Surgery  [phone]  04/27/23

## 2023-05-10 LAB
Lab: NORMAL
REPORT: NORMAL
THIS TEST SENT TO: NORMAL

## 2023-05-11 ENCOUNTER — OFFICE VISIT (OUTPATIENT)
Dept: SURGERY | Age: 48
End: 2023-05-11

## 2023-05-11 VITALS
SYSTOLIC BLOOD PRESSURE: 125 MMHG | BODY MASS INDEX: 30.52 KG/M2 | OXYGEN SATURATION: 98 % | HEART RATE: 72 BPM | TEMPERATURE: 98.7 F | WEIGHT: 225 LBS | DIASTOLIC BLOOD PRESSURE: 82 MMHG

## 2023-05-11 DIAGNOSIS — Z09 POSTOP CHECK: Primary | ICD-10-CM

## 2023-05-11 PROCEDURE — 99024 POSTOP FOLLOW-UP VISIT: CPT

## 2023-05-11 NOTE — PROGRESS NOTES
[facility]    PROCEDURE: 1) Immediate bilateralstage I breast reconstruction with tissue expander placement                          2) Insertion of Alloderm Acellular Dermal Matrix (328 cm^2))   3) Intraoperative SPY fluorescent angiography                            DATE: 4/4/23    Gokul Narayanan has been recovering well since her procedure. Pain has been well controlled with the prescribed pain management regimen. Patient presents today to initiate TE fill. EXAM    /82   Pulse 72   Temp 98.7 °F (37.1 °C)   Wt 225 lb (102.1 kg)   SpO2 98%   BMI 30.52 kg/m²       GEN: NAD   BREAST: Incision site healing appropriately. No hematoma/seroma. IMP: 41 y. o.female s/p B TE   PLAN: Doing well overall. A total of 200 ml of sterile saline was placed into the expanders bringing the total to 400 ml (650 expanders). Will follow up in 2 weeks for final TE fill. Will call with any concerns in the interim.      Makc Lindsey, APRN - [id number] [facility] Reconstructive Surgery  [phone]  05/11/23

## 2023-05-24 NOTE — PROGRESS NOTES
[facility]    PROCEDURE: 1) Immediate bilateralstage I breast reconstruction with tissue expander placement                          2) Insertion of Alloderm Acellular Dermal Matrix (328 cm^2))   3) Intraoperative SPY fluorescent angiography                            DATE: 4/4/23    Villa Corona has been recovering well since her procedure. Pain has been well controlled with the prescribed pain management regimen. Patient presents today for final TE fill. EXAM    BP (!) 125/90   Pulse 75   Temp 98 °F (36.7 °C)   Wt 225 lb (102.1 kg)   SpO2 98%   BMI 30.52 kg/m²      GEN: NAD   BREAST: Incision site healing appropriately. No hematoma/seroma. IMP: 41 y. o.female s/p B TE   PLAN: Doing well overall. A total of 200 ml of sterile saline was placed into the expanders bringing the total to 600 ml (650 expanders). Will follow up in 1 month with Dr. Dylon Sharma to discuss second surgery. Will call with any concerns in the interim.      Wayne Diane, APRN - [id number] [facility] Reconstructive Surgery  [phone]  05/25/23

## 2023-05-25 ENCOUNTER — OFFICE VISIT (OUTPATIENT)
Dept: SURGERY | Age: 48
End: 2023-05-25

## 2023-05-25 VITALS
TEMPERATURE: 98 F | WEIGHT: 225 LBS | BODY MASS INDEX: 30.52 KG/M2 | DIASTOLIC BLOOD PRESSURE: 90 MMHG | OXYGEN SATURATION: 98 % | HEART RATE: 75 BPM | SYSTOLIC BLOOD PRESSURE: 125 MMHG

## 2023-05-25 DIAGNOSIS — Z09 POSTOP CHECK: Primary | ICD-10-CM

## 2023-05-25 PROCEDURE — 99024 POSTOP FOLLOW-UP VISIT: CPT

## 2023-06-28 ENCOUNTER — OFFICE VISIT (OUTPATIENT)
Dept: SURGERY | Age: 48
End: 2023-06-28

## 2023-06-28 VITALS
WEIGHT: 223 LBS | BODY MASS INDEX: 30.24 KG/M2 | HEART RATE: 76 BPM | TEMPERATURE: 98.7 F | OXYGEN SATURATION: 98 % | DIASTOLIC BLOOD PRESSURE: 94 MMHG | SYSTOLIC BLOOD PRESSURE: 133 MMHG

## 2023-06-28 DIAGNOSIS — Z09 POSTOP CHECK: Primary | ICD-10-CM

## 2023-06-28 PROCEDURE — 99024 POSTOP FOLLOW-UP VISIT: CPT | Performed by: SURGERY

## 2023-06-30 ENCOUNTER — TELEPHONE (OUTPATIENT)
Dept: SURGERY | Age: 48
End: 2023-06-30

## 2023-07-05 RX ORDER — CARVEDILOL 3.12 MG/1
TABLET ORAL
Qty: 180 TABLET | Refills: 2 | Status: SHIPPED | OUTPATIENT
Start: 2023-07-05

## 2023-07-05 NOTE — TELEPHONE ENCOUNTER
Received refill request for carvedilol (COREG) 3.125 MG tablet from [facility].      Last OV: 3-9-2023 MXA    Next OV: 12-8-2023 NPSR    Last EKG: 3-9-2023     Last Filled:  10-5-2022 NPSR

## 2023-07-07 NOTE — TELEPHONE ENCOUNTER
I spoke with the patient at the home number listed. The patient is now scheduled for surgery with  on 9-8-2023. The patient is aware of H&P. The patient is scheduled for her post op appointment 9-. I will submit the surgery letter to [facility] today. I will mail the surgery information and instructions to the patient today. I will close this phone note.

## 2023-07-07 NOTE — TELEPHONE ENCOUNTER
I received a fax from [facility] dated 7-3-2023. I will scan the fa into Epic under the media tab. APPROVED  Reference Number [id number]  CPT Codes 77712, [id number], [id number], [id number]  Date range 6- to 1-3-2024    I lmom for the patient at the home number listed. I requested a call back to discuss insurance and surgery scheduling. I will leave this phone note open.

## 2023-07-25 NOTE — PROGRESS NOTES
evaluation. Follow up surveillance was discussed. Our plan at this time is to follow up with surgical breast oncology office in ~6 months for a clinical breast exam.        All of the patient's questions were answered at this time however, she was encouraged to call the office with any further inquiries. Approximately 20 minutes of time were spent in preparation, direct patient contact, care coordination, documentation and activities otherwise related to this encounter.

## 2023-07-31 ENCOUNTER — TELEPHONE (OUTPATIENT)
Dept: CARDIOLOGY CLINIC | Age: 48
End: 2023-07-31

## 2023-07-31 ENCOUNTER — OFFICE VISIT (OUTPATIENT)
Dept: SURGERY | Age: 48
End: 2023-07-31
Payer: COMMERCIAL

## 2023-07-31 VITALS
BODY MASS INDEX: 30.48 KG/M2 | HEART RATE: 71 BPM | SYSTOLIC BLOOD PRESSURE: 134 MMHG | RESPIRATION RATE: 18 BRPM | HEIGHT: 72 IN | WEIGHT: 225 LBS | OXYGEN SATURATION: 98 % | DIASTOLIC BLOOD PRESSURE: 88 MMHG

## 2023-07-31 DIAGNOSIS — Z12.39 SCREENING BREAST EXAMINATION: ICD-10-CM

## 2023-07-31 DIAGNOSIS — Z85.3 PERSONAL HISTORY OF BREAST CANCER: Primary | ICD-10-CM

## 2023-07-31 DIAGNOSIS — Z08 ENCOUNTER FOR FOLLOW-UP SURVEILLANCE OF BREAST CANCER: ICD-10-CM

## 2023-07-31 DIAGNOSIS — Z85.3 ENCOUNTER FOR FOLLOW-UP SURVEILLANCE OF BREAST CANCER: ICD-10-CM

## 2023-07-31 PROCEDURE — 99213 OFFICE O/P EST LOW 20 MIN: CPT | Performed by: SURGERY

## 2023-07-31 RX ORDER — TAMOXIFEN CITRATE 20 MG/1
TABLET ORAL
COMMUNITY
Start: 2023-05-12

## 2023-07-31 NOTE — TELEPHONE ENCOUNTER
CARDIAC CLEARANCE     What type of procedure are you having? Breast reconstruction from mastectomy    Which physician is performing your procedure? Dr Miladys Luque    When is your procedure scheduled for?  9/8/23    Where are you having this procedure? [place]    Are you taking Blood Thinners? NO   If so what? (Name/dose/frequesncy)     Does the surgeon want you to stop your blood thinner? If so for how long?     Phone Number and Contact Name for Physicians office:     Fax number to send information: Place epic

## 2023-08-23 ENCOUNTER — OFFICE VISIT (OUTPATIENT)
Dept: FAMILY MEDICINE CLINIC | Age: 48
End: 2023-08-23
Payer: COMMERCIAL

## 2023-08-23 VITALS
SYSTOLIC BLOOD PRESSURE: 116 MMHG | TEMPERATURE: 97.6 F | OXYGEN SATURATION: 98 % | HEART RATE: 65 BPM | WEIGHT: 223.4 LBS | DIASTOLIC BLOOD PRESSURE: 64 MMHG | BODY MASS INDEX: 30.3 KG/M2

## 2023-08-23 DIAGNOSIS — Z01.818 PREOPERATIVE EXAMINATION: ICD-10-CM

## 2023-08-23 DIAGNOSIS — Z01.818 PREOPERATIVE EXAMINATION: Primary | ICD-10-CM

## 2023-08-23 DIAGNOSIS — I10 ESSENTIAL HYPERTENSION: ICD-10-CM

## 2023-08-23 DIAGNOSIS — Z85.3 PERSONAL HISTORY OF MALIGNANT NEOPLASM OF BREAST: ICD-10-CM

## 2023-08-23 DIAGNOSIS — E66.09 CLASS 1 OBESITY DUE TO EXCESS CALORIES WITH SERIOUS COMORBIDITY AND BODY MASS INDEX (BMI) OF 30.0 TO 30.9 IN ADULT: ICD-10-CM

## 2023-08-23 LAB
ALBUMIN SERPL-MCNC: 4.5 G/DL (ref 3.4–5)
ALP SERPL-CCNC: 52 U/L (ref 40–129)
ALT SERPL-CCNC: 6 U/L (ref 10–40)
ANION GAP SERPL CALCULATED.3IONS-SCNC: 13 MMOL/L (ref 3–16)
APTT BLD: 31.3 SEC (ref 22.7–35.9)
AST SERPL-CCNC: 11 U/L (ref 15–37)
BASOPHILS # BLD: 0.1 K/UL (ref 0–0.2)
BASOPHILS NFR BLD: 0.8 %
BILIRUB DIRECT SERPL-MCNC: <0.2 MG/DL (ref 0–0.3)
BILIRUB INDIRECT SERPL-MCNC: ABNORMAL MG/DL (ref 0–1)
BILIRUB SERPL-MCNC: 0.4 MG/DL (ref 0–1)
BUN SERPL-MCNC: 11 MG/DL (ref 7–20)
CALCIUM SERPL-MCNC: 9.3 MG/DL (ref 8.3–10.6)
CHLORIDE SERPL-SCNC: 104 MMOL/L (ref 99–110)
CO2 SERPL-SCNC: 24 MMOL/L (ref 21–32)
CREAT SERPL-MCNC: 0.7 MG/DL (ref 0.6–1.1)
DEPRECATED RDW RBC AUTO: 13.3 % (ref 12.4–15.4)
EOSINOPHIL # BLD: 0.1 K/UL (ref 0–0.6)
EOSINOPHIL NFR BLD: 1.5 %
GFR SERPLBLD CREATININE-BSD FMLA CKD-EPI: >60 ML/MIN/{1.73_M2}
GLUCOSE SERPL-MCNC: 89 MG/DL (ref 70–99)
HCT VFR BLD AUTO: 39.9 % (ref 36–48)
HGB BLD-MCNC: 13.7 G/DL (ref 12–16)
INR PPP: 1.01 (ref 0.84–1.16)
LYMPHOCYTES # BLD: 2.4 K/UL (ref 1–5.1)
LYMPHOCYTES NFR BLD: 37.4 %
MCH RBC QN AUTO: 31.7 PG (ref 26–34)
MCHC RBC AUTO-ENTMCNC: 34.3 G/DL (ref 31–36)
MCV RBC AUTO: 92.4 FL (ref 80–100)
MONOCYTES # BLD: 0.7 K/UL (ref 0–1.3)
MONOCYTES NFR BLD: 11 %
NEUTROPHILS # BLD: 3.1 K/UL (ref 1.7–7.7)
NEUTROPHILS NFR BLD: 49.3 %
PHOSPHATE SERPL-MCNC: 3.2 MG/DL (ref 2.5–4.9)
PLATELET # BLD AUTO: 253 K/UL (ref 135–450)
PMV BLD AUTO: 9.4 FL (ref 5–10.5)
POTASSIUM SERPL-SCNC: 4.5 MMOL/L (ref 3.5–5.1)
PROT SERPL-MCNC: 7 G/DL (ref 6.4–8.2)
PROTHROMBIN TIME: 13.3 SEC (ref 11.5–14.8)
RBC # BLD AUTO: 4.32 M/UL (ref 4–5.2)
SODIUM SERPL-SCNC: 141 MMOL/L (ref 136–145)
WBC # BLD AUTO: 6.4 K/UL (ref 4–11)

## 2023-08-23 PROCEDURE — 99203 OFFICE O/P NEW LOW 30 MIN: CPT | Performed by: FAMILY MEDICINE

## 2023-08-23 PROCEDURE — 93000 ELECTROCARDIOGRAM COMPLETE: CPT | Performed by: FAMILY MEDICINE

## 2023-08-23 PROCEDURE — 3078F DIAST BP <80 MM HG: CPT | Performed by: FAMILY MEDICINE

## 2023-08-23 PROCEDURE — 3074F SYST BP LT 130 MM HG: CPT | Performed by: FAMILY MEDICINE

## 2023-08-23 RX ORDER — TAMOXIFEN CITRATE 20 MG/1
20 TABLET ORAL
COMMUNITY
Start: 2023-05-11 | End: 2023-08-23 | Stop reason: SDUPTHER

## 2023-08-24 LAB
EST. AVERAGE GLUCOSE BLD GHB EST-MCNC: 96.8 MG/DL
HBA1C MFR BLD: 5 %

## 2023-09-06 NOTE — PROGRESS NOTES
Place patient label inside box (if no patient label, complete below)  Name:  :      MR#:   Angeline Esposito / PROCEDURE  I (we), Tho Bernal (Patient Name) authorize Jo Potter MD (Provider / Marcie Kirk) and/or such assistants as may be selected by him/her, to perform the following operation/procedure(s): EXCHANGE FOR PERMANENT IMPLANTS BILATERAL BREASTS BILATERAL BREAST CAPSULECTOMIES, BILATERAL BREAST FAT GRAFTING        Note: If unable to obtain consent prior to an emergent procedure, document the emergent reason in the medical record. This procedure has been explained to my (our) satisfaction and included in the explanation was: The intended benefit, nature, and extent of the procedure to be performed; The significant risks involved and the probability of success; Alternative procedures and methods of treatment; The dangers and probable consequences of such alternatives (including no procedure or treatment); The expected consequences of the procedure on my future health; Whether other qualified individuals would be performing important surgical tasks and/or whether  would be present to advise or support the procedure. I (we) understand that there are other risks of infection and other serious complications in the pre-operative/procedural and postoperative/procedural stages of my (our) care. I (we) have asked all of the questions which I (we) thought were important in deciding whether or not to undergo treatment or diagnosis. These questions have been answered to my (our) satisfaction. I (we) understand that no assurance can be given that the procedure will be a success, and no guarantee or warranty of success has been given to me (us).     It has been explained to me (us) that during the course of the operation/procedure, unforeseen conditions may be revealed that necessitate extension of the original procedure(s) or different

## 2023-09-06 NOTE — PROGRESS NOTES
PRE-OP INSTRUCTIONS FOR SURGICAL PATIENTS      EMAIL [email or web address]     Our Pre-admission Testing Nurses tried and were unable to reach you today. Please read the attached instructions if you did not listen to your voicemail. Follow all instructions provided to you from your surgeon's office, including your ARRIVAL TIME. Arrange for someone to drive you home and be with you for the first 24 hours after discharge. NOTE: at this time ONLY 2 ADULTS may accompany you   One person encouraged to stay at hospital entire time if outpatient surgery    Enter the MAIN entrance located on [address] and report to the surgical desk on the LEFT side of the lobby. Please park in the parking garage or there is free [employer] available after 7am for your use. Bring your insurance card & photo ID with you to register. Bring your medication list with you with dose and frequency listed (including over the counter medications)  Contact your ordering physician/surgeon for medication instructions as soon as possible, especially if taking blood thinners, aspirin, heart, or diabetic medication. Bariatric surgical patients need to call your surgeon if on diabetic medications (as some may need to be stopped 1-week preop)  A Pre-Surgical History and Physical MUST be completed WITHIN 30 DAYS OR LESS prior to your procedure by your Physician or an Urgent Care. DO NOT EAT ANYTHING 8 hours prior to arrival for surgery. You may have sips of WATER ONLY (up to 8 ounces) 4 hours prior to your arrival for surgery. Then nothing further 4 hours prior to arriving at hospital. IF STILL TAKING CARVEDILOL (COREG) AND  ENALAPRIL (VASOTEC) IN THE MORNINGS, MAY TAKE WITH A SIP OF WATER BEFORE COMING IN DAY OF SURGERY   NOTE: ALL Gastric, Bariatric & Bowel surgery patients - you MUST follow your surgeon's instructions regarding eating/drinking as you will have very specific instructions to follow.   If you did not receive these, call

## 2023-09-08 ENCOUNTER — ANESTHESIA (OUTPATIENT)
Dept: OPERATING ROOM | Age: 48
End: 2023-09-08
Payer: COMMERCIAL

## 2023-09-08 ENCOUNTER — HOSPITAL ENCOUNTER (OUTPATIENT)
Age: 48
Setting detail: OUTPATIENT SURGERY
Discharge: HOME | End: 2023-09-08
Attending: SURGERY | Admitting: SURGERY
Payer: COMMERCIAL

## 2023-09-08 ENCOUNTER — ANESTHESIA EVENT (OUTPATIENT)
Dept: OPERATING ROOM | Age: 48
End: 2023-09-08
Payer: COMMERCIAL

## 2023-09-08 VITALS
DIASTOLIC BLOOD PRESSURE: 88 MMHG | RESPIRATION RATE: 16 BRPM | SYSTOLIC BLOOD PRESSURE: 132 MMHG | HEIGHT: 72 IN | BODY MASS INDEX: 30.26 KG/M2 | OXYGEN SATURATION: 96 % | TEMPERATURE: 96.9 F | WEIGHT: 223.4 LBS | HEART RATE: 80 BPM

## 2023-09-08 DIAGNOSIS — C50.919 MALIGNANT NEOPLASM OF FEMALE BREAST, UNSPECIFIED ESTROGEN RECEPTOR STATUS, UNSPECIFIED LATERALITY, UNSPECIFIED SITE OF BREAST (HCC): ICD-10-CM

## 2023-09-08 DIAGNOSIS — Z98.890 S/P BREAST RECONSTRUCTION: ICD-10-CM

## 2023-09-08 DIAGNOSIS — C50.912 MALIGNANT NEOPLASM OF LEFT FEMALE BREAST, UNSPECIFIED ESTROGEN RECEPTOR STATUS, UNSPECIFIED SITE OF BREAST (HCC): Primary | ICD-10-CM

## 2023-09-08 LAB — HCG UR QL: NEGATIVE

## 2023-09-08 PROCEDURE — C1789 PROSTHESIS, BREAST, IMP: HCPCS | Performed by: SURGERY

## 2023-09-08 PROCEDURE — 2720000010 HC SURG SUPPLY STERILE: Performed by: SURGERY

## 2023-09-08 PROCEDURE — 88300 SURGICAL PATH GROSS: CPT

## 2023-09-08 PROCEDURE — 2580000003 HC RX 258: Performed by: FAMILY MEDICINE

## 2023-09-08 PROCEDURE — 2500000003 HC RX 250 WO HCPCS: Performed by: NURSE ANESTHETIST, CERTIFIED REGISTERED

## 2023-09-08 PROCEDURE — 2500000003 HC RX 250 WO HCPCS: Performed by: SURGERY

## 2023-09-08 PROCEDURE — 84703 CHORIONIC GONADOTROPIN ASSAY: CPT

## 2023-09-08 PROCEDURE — 11970 RPLCMT TISS XPNDR PERM IMPLT: CPT | Performed by: SURGERY

## 2023-09-08 PROCEDURE — A4217 STERILE WATER/SALINE, 500 ML: HCPCS | Performed by: SURGERY

## 2023-09-08 PROCEDURE — 6360000002 HC RX W HCPCS: Performed by: NURSE ANESTHETIST, CERTIFIED REGISTERED

## 2023-09-08 PROCEDURE — 7100000000 HC PACU RECOVERY - FIRST 15 MIN: Performed by: SURGERY

## 2023-09-08 PROCEDURE — 15771 GRFG AUTOL FAT LIPO 50 CC/<: CPT | Performed by: SURGERY

## 2023-09-08 PROCEDURE — 15772 GRFG AUTOL FAT LIPO EA ADDL: CPT | Performed by: SURGERY

## 2023-09-08 PROCEDURE — 6360000002 HC RX W HCPCS: Performed by: FAMILY MEDICINE

## 2023-09-08 PROCEDURE — 7100000011 HC PHASE II RECOVERY - ADDTL 15 MIN: Performed by: SURGERY

## 2023-09-08 PROCEDURE — 6360000002 HC RX W HCPCS: Performed by: SURGERY

## 2023-09-08 PROCEDURE — 3600000014 HC SURGERY LEVEL 4 ADDTL 15MIN: Performed by: SURGERY

## 2023-09-08 PROCEDURE — 3700000000 HC ANESTHESIA ATTENDED CARE: Performed by: SURGERY

## 2023-09-08 PROCEDURE — 3700000001 HC ADD 15 MINUTES (ANESTHESIA): Performed by: SURGERY

## 2023-09-08 PROCEDURE — 2709999900 HC NON-CHARGEABLE SUPPLY: Performed by: SURGERY

## 2023-09-08 PROCEDURE — 7100000010 HC PHASE II RECOVERY - FIRST 15 MIN: Performed by: SURGERY

## 2023-09-08 PROCEDURE — 3600000004 HC SURGERY LEVEL 4 BASE: Performed by: SURGERY

## 2023-09-08 PROCEDURE — 7100000001 HC PACU RECOVERY - ADDTL 15 MIN: Performed by: SURGERY

## 2023-09-08 PROCEDURE — 2580000003 HC RX 258: Performed by: SURGERY

## 2023-09-08 PROCEDURE — C9399 UNCLASSIFIED DRUGS OR BIOLOG: HCPCS | Performed by: NURSE ANESTHETIST, CERTIFIED REGISTERED

## 2023-09-08 PROCEDURE — 6370000000 HC RX 637 (ALT 250 FOR IP): Performed by: FAMILY MEDICINE

## 2023-09-08 DEVICE — IMPLANTABLE DEVICE: Type: IMPLANTABLE DEVICE | Site: BREAST | Status: FUNCTIONAL

## 2023-09-08 RX ORDER — ONDANSETRON 2 MG/ML
INJECTION INTRAMUSCULAR; INTRAVENOUS PRN
Status: DISCONTINUED | OUTPATIENT
Start: 2023-09-08 | End: 2023-09-08 | Stop reason: SDUPTHER

## 2023-09-08 RX ORDER — ROCURONIUM BROMIDE 10 MG/ML
INJECTION, SOLUTION INTRAVENOUS PRN
Status: DISCONTINUED | OUTPATIENT
Start: 2023-09-08 | End: 2023-09-08 | Stop reason: SDUPTHER

## 2023-09-08 RX ORDER — APREPITANT 40 MG/1
40 CAPSULE ORAL ONCE
Status: COMPLETED | OUTPATIENT
Start: 2023-09-08 | End: 2023-09-08

## 2023-09-08 RX ORDER — PHENYLEPHRINE HYDROCHLORIDE 10 MG/ML
INJECTION INTRAVENOUS PRN
Status: DISCONTINUED | OUTPATIENT
Start: 2023-09-08 | End: 2023-09-08 | Stop reason: SDUPTHER

## 2023-09-08 RX ORDER — LABETALOL HYDROCHLORIDE 5 MG/ML
10 INJECTION, SOLUTION INTRAVENOUS
Status: DISCONTINUED | OUTPATIENT
Start: 2023-09-08 | End: 2023-09-08 | Stop reason: HOSPADM

## 2023-09-08 RX ORDER — LIDOCAINE HYDROCHLORIDE 20 MG/ML
INJECTION, SOLUTION INTRAVENOUS PRN
Status: DISCONTINUED | OUTPATIENT
Start: 2023-09-08 | End: 2023-09-08 | Stop reason: SDUPTHER

## 2023-09-08 RX ORDER — LORAZEPAM 2 MG/ML
0.5 INJECTION INTRAMUSCULAR
Status: DISCONTINUED | OUTPATIENT
Start: 2023-09-08 | End: 2023-09-08 | Stop reason: HOSPADM

## 2023-09-08 RX ORDER — FENTANYL CITRATE 50 UG/ML
25 INJECTION, SOLUTION INTRAMUSCULAR; INTRAVENOUS EVERY 5 MIN PRN
Status: DISCONTINUED | OUTPATIENT
Start: 2023-09-08 | End: 2023-09-08 | Stop reason: HOSPADM

## 2023-09-08 RX ORDER — CEPHALEXIN 500 MG/1
500 CAPSULE ORAL 4 TIMES DAILY
Qty: 28 CAPSULE | Refills: 0 | Status: SHIPPED | OUTPATIENT
Start: 2023-09-08 | End: 2023-09-15

## 2023-09-08 RX ORDER — ONDANSETRON 2 MG/ML
4 INJECTION INTRAMUSCULAR; INTRAVENOUS
Status: DISCONTINUED | OUTPATIENT
Start: 2023-09-08 | End: 2023-09-08 | Stop reason: HOSPADM

## 2023-09-08 RX ORDER — HYDROMORPHONE HYDROCHLORIDE 1 MG/ML
0.5 INJECTION, SOLUTION INTRAMUSCULAR; INTRAVENOUS; SUBCUTANEOUS EVERY 5 MIN PRN
Status: DISCONTINUED | OUTPATIENT
Start: 2023-09-08 | End: 2023-09-08 | Stop reason: HOSPADM

## 2023-09-08 RX ORDER — SODIUM CHLORIDE, SODIUM LACTATE, POTASSIUM CHLORIDE, CALCIUM CHLORIDE 600; 310; 30; 20 MG/100ML; MG/100ML; MG/100ML; MG/100ML
INJECTION, SOLUTION INTRAVENOUS CONTINUOUS
Status: DISCONTINUED | OUTPATIENT
Start: 2023-09-08 | End: 2023-09-08 | Stop reason: HOSPADM

## 2023-09-08 RX ORDER — PROCHLORPERAZINE EDISYLATE 5 MG/ML
5 INJECTION INTRAMUSCULAR; INTRAVENOUS
Status: DISCONTINUED | OUTPATIENT
Start: 2023-09-08 | End: 2023-09-08 | Stop reason: HOSPADM

## 2023-09-08 RX ORDER — SCOLOPAMINE TRANSDERMAL SYSTEM 1 MG/1
1 PATCH, EXTENDED RELEASE TRANSDERMAL ONCE
Status: DISCONTINUED | OUTPATIENT
Start: 2023-09-08 | End: 2023-09-08 | Stop reason: HOSPADM

## 2023-09-08 RX ORDER — MEPERIDINE HYDROCHLORIDE 25 MG/ML
12.5 INJECTION INTRAMUSCULAR; INTRAVENOUS; SUBCUTANEOUS EVERY 5 MIN PRN
Status: DISCONTINUED | OUTPATIENT
Start: 2023-09-08 | End: 2023-09-08 | Stop reason: HOSPADM

## 2023-09-08 RX ORDER — IPRATROPIUM BROMIDE AND ALBUTEROL SULFATE 2.5; .5 MG/3ML; MG/3ML
1 SOLUTION RESPIRATORY (INHALATION)
Status: DISCONTINUED | OUTPATIENT
Start: 2023-09-08 | End: 2023-09-08 | Stop reason: HOSPADM

## 2023-09-08 RX ORDER — PROPOFOL 10 MG/ML
INJECTION, EMULSION INTRAVENOUS PRN
Status: DISCONTINUED | OUTPATIENT
Start: 2023-09-08 | End: 2023-09-08 | Stop reason: SDUPTHER

## 2023-09-08 RX ORDER — DIPHENHYDRAMINE HYDROCHLORIDE 50 MG/ML
12.5 INJECTION INTRAMUSCULAR; INTRAVENOUS
Status: DISCONTINUED | OUTPATIENT
Start: 2023-09-08 | End: 2023-09-08 | Stop reason: HOSPADM

## 2023-09-08 RX ORDER — HYDROMORPHONE HYDROCHLORIDE 2 MG/ML
INJECTION, SOLUTION INTRAMUSCULAR; INTRAVENOUS; SUBCUTANEOUS PRN
Status: DISCONTINUED | OUTPATIENT
Start: 2023-09-08 | End: 2023-09-08 | Stop reason: SDUPTHER

## 2023-09-08 RX ORDER — HYDRALAZINE HYDROCHLORIDE 20 MG/ML
INJECTION INTRAMUSCULAR; INTRAVENOUS PRN
Status: DISCONTINUED | OUTPATIENT
Start: 2023-09-08 | End: 2023-09-08 | Stop reason: SDUPTHER

## 2023-09-08 RX ORDER — ATROPINE SULFATE 0.1 MG/ML
INJECTION INTRAVENOUS PRN
Status: DISCONTINUED | OUTPATIENT
Start: 2023-09-08 | End: 2023-09-08 | Stop reason: SDUPTHER

## 2023-09-08 RX ORDER — SODIUM CHLORIDE 0.9 % (FLUSH) 0.9 %
5-40 SYRINGE (ML) INJECTION EVERY 12 HOURS SCHEDULED
Status: DISCONTINUED | OUTPATIENT
Start: 2023-09-08 | End: 2023-09-08 | Stop reason: HOSPADM

## 2023-09-08 RX ORDER — SODIUM CHLORIDE 0.9 % (FLUSH) 0.9 %
5-40 SYRINGE (ML) INJECTION PRN
Status: DISCONTINUED | OUTPATIENT
Start: 2023-09-08 | End: 2023-09-08 | Stop reason: HOSPADM

## 2023-09-08 RX ORDER — FAMOTIDINE 10 MG/ML
INJECTION, SOLUTION INTRAVENOUS PRN
Status: DISCONTINUED | OUTPATIENT
Start: 2023-09-08 | End: 2023-09-08 | Stop reason: SDUPTHER

## 2023-09-08 RX ORDER — SODIUM CHLORIDE 9 MG/ML
INJECTION, SOLUTION INTRAVENOUS PRN
Status: DISCONTINUED | OUTPATIENT
Start: 2023-09-08 | End: 2023-09-08 | Stop reason: HOSPADM

## 2023-09-08 RX ORDER — ACETAMINOPHEN 325 MG/1
650 TABLET ORAL
Status: DISCONTINUED | OUTPATIENT
Start: 2023-09-08 | End: 2023-09-08 | Stop reason: HOSPADM

## 2023-09-08 RX ORDER — KETOROLAC TROMETHAMINE 30 MG/ML
INJECTION, SOLUTION INTRAMUSCULAR; INTRAVENOUS PRN
Status: DISCONTINUED | OUTPATIENT
Start: 2023-09-08 | End: 2023-09-08 | Stop reason: SDUPTHER

## 2023-09-08 RX ORDER — OXYCODONE HYDROCHLORIDE AND ACETAMINOPHEN 5; 325 MG/1; MG/1
1 TABLET ORAL EVERY 6 HOURS PRN
Qty: 28 TABLET | Refills: 0 | Status: SHIPPED | OUTPATIENT
Start: 2023-09-08 | End: 2023-09-15

## 2023-09-08 RX ADMIN — ROCURONIUM BROMIDE 20 MG: 10 INJECTION, SOLUTION INTRAVENOUS at 08:21

## 2023-09-08 RX ADMIN — ATROPINE SULFATE 0.2 MG: 0.1 INJECTION, SOLUTION INTRAVENOUS at 08:33

## 2023-09-08 RX ADMIN — ONDANSETRON 8 MG: 2 INJECTION INTRAMUSCULAR; INTRAVENOUS at 07:31

## 2023-09-08 RX ADMIN — SODIUM CHLORIDE, POTASSIUM CHLORIDE, SODIUM LACTATE AND CALCIUM CHLORIDE: 600; 310; 30; 20 INJECTION, SOLUTION INTRAVENOUS at 06:53

## 2023-09-08 RX ADMIN — PROPOFOL 200 MG: 10 INJECTION, EMULSION INTRAVENOUS at 07:31

## 2023-09-08 RX ADMIN — SUGAMMADEX 200 MG: 100 INJECTION, SOLUTION INTRAVENOUS at 08:51

## 2023-09-08 RX ADMIN — SODIUM CHLORIDE, POTASSIUM CHLORIDE, SODIUM LACTATE AND CALCIUM CHLORIDE: 600; 310; 30; 20 INJECTION, SOLUTION INTRAVENOUS at 07:59

## 2023-09-08 RX ADMIN — LIDOCAINE HYDROCHLORIDE 100 MG: 20 INJECTION, SOLUTION INTRAVENOUS at 07:31

## 2023-09-08 RX ADMIN — FAMOTIDINE 20 MG: 10 INJECTION, SOLUTION INTRAVENOUS at 07:29

## 2023-09-08 RX ADMIN — PHENYLEPHRINE HYDROCHLORIDE 100 MCG: 10 INJECTION INTRAVENOUS at 07:37

## 2023-09-08 RX ADMIN — SODIUM CHLORIDE, POTASSIUM CHLORIDE, SODIUM LACTATE AND CALCIUM CHLORIDE: 600; 310; 30; 20 INJECTION, SOLUTION INTRAVENOUS at 08:50

## 2023-09-08 RX ADMIN — HYDRALAZINE HYDROCHLORIDE 10 MG: 20 INJECTION INTRAMUSCULAR; INTRAVENOUS at 08:31

## 2023-09-08 RX ADMIN — TRANEXAMIC ACID 1000 MG: 100 INJECTION, SOLUTION INTRAVENOUS at 07:38

## 2023-09-08 RX ADMIN — APREPITANT 40 MG: 40 CAPSULE ORAL at 07:11

## 2023-09-08 RX ADMIN — KETOROLAC TROMETHAMINE 30 MG: 30 INJECTION, SOLUTION INTRAMUSCULAR at 07:37

## 2023-09-08 RX ADMIN — ROCURONIUM BROMIDE 80 MG: 10 INJECTION, SOLUTION INTRAVENOUS at 07:31

## 2023-09-08 RX ADMIN — HYDROMORPHONE HYDROCHLORIDE 2 MG: 2 INJECTION, SOLUTION INTRAMUSCULAR; INTRAVENOUS; SUBCUTANEOUS at 07:31

## 2023-09-08 RX ADMIN — CEFAZOLIN 2000 MG: 2 INJECTION, POWDER, FOR SOLUTION INTRAMUSCULAR; INTRAVENOUS at 07:34

## 2023-09-08 ASSESSMENT — PAIN SCALES - GENERAL
PAINLEVEL_OUTOF10: 0
PAINLEVEL_OUTOF10: 0

## 2023-09-08 ASSESSMENT — PAIN - FUNCTIONAL ASSESSMENT: PAIN_FUNCTIONAL_ASSESSMENT: 0-10

## 2023-09-08 NOTE — PROGRESS NOTES
Patient arrived from OR to PACU # 5 s/p STERI STRIPS, RESTON FOAM, ABDOMINAL BINDER per . Attached to PACU monitoring device, report received from CRNA who stated no problems intraoperatively. Arrived awake, VSS, on RA denied pain when asked.

## 2023-09-08 NOTE — OP NOTE
[address] [facility],[address] SURGERY     OPERATIVE DICTATION    NAME: Marjan Aranda   MRN: [id number]  DATE: 9/8/2023    AGE: 57 y.o. SURGEON: Zofia Ellis MD  ASSISTANT: Faisal GALAN)     PREOPERATIVE DIAGNOSIS: Acquired absence bilateral breast, status post tissue expander placement. POSTOPERATIVE DIAGNOSIS: Same     OPERATION: 1) Exchange for permanent implants bilaterall breasts    2) Bilateral breast high volume fat grafting    ANESTHESIA: General     ESTIMATED BLOOD LOSS: 50 mL    OPERATIVE INDICATIONS: This is a 57 y.o. female who underwent mastectomy with tissue expander placement for breast cancer with details listed in a separate operative dictation. Options of reconstruction were discussed with the patient and she opted for an implant based reconstruction in her second stage. The plan of surgery, risks, benefits, alternatives, indications, limitations, possible complications, and future surgeries were discussed with the patient and she agreed to proceed. OPERATIVE PROCEDURE: The patient was marked in the standing position in the preoperative holding area. She was then brought to the operating room and placed in the supine position on the operating table. After satisfactory induction with general endotracheal anesthesia, the patient was then prepped and draped in the usual sterile fashion. The case began by infiltrating tumescent solution into the abdomen. Attention was then directed to the right breast. An inframammary incision was performed. The tissue dissected down to the capsule, which was incised allowing entry to the pocket. The expander was evacuated of saline and removed. The capsule was then evaluated. The capsule had satisfactory appearance, thus standard capsulotomies were performed. After capsule work, multiple sizers were utilized to find the best fit.  Once the appropriate size was selected, attention was directed to the contralateral left breast and the

## 2023-09-08 NOTE — H&P
Update History & Physical    The patient's History and Physical of August 23, 2023 was reviewed with the patient and I examined the patient. There was no change. The surgical site was confirmed by the patient and me. Plan: The risks, benefits, expected outcome, and alternative to the recommended procedure have been discussed with the patient. Patient understands and wants to proceed with the procedure.      Electronically signed by Jagdeep Isidro MD on 9/8/2023 at 7:28 AM

## 2023-09-08 NOTE — ANESTHESIA POSTPROCEDURE EVALUATION
Department of Anesthesiology  Postprocedure Note    Patient: Cecilio Ding  MRN: [id number]  YOB: 1975  Date of evaluation: 9/8/2023      Procedure Summary     Date: 09/08/23 Room / Location: [facility] 01 / The [address] [facility]    Anesthesia Start: 9896 Anesthesia Stop: 0903    Procedures:       EXCHANGE FOR PERMANENT IMPLANTS BILATERAL BREASTS, BILATERAL BREAST FAT GRAFTING (Bilateral: Breast)      . (Bilateral: Breast) Diagnosis:       Malignant neoplasm of female breast, unspecified estrogen receptor status, unspecified laterality, unspecified site of breast ([address])      S/P breast reconstruction      (Malignant neoplasm of female breast, unspecified estrogen receptor status, unspecified laterality, unspecified site of breast ([address]) [C50.919])      (S/P breast reconstruction [P30.159])    Surgeons: Yamila Haskins MD Responsible Provider: Angela Zarate MD    Anesthesia Type: general ASA Status: 3          Anesthesia Type: No value filed.     Radha Phase I: Radha Score: 10    Radha Phase II:        Anesthesia Post Evaluation    Patient location during evaluation: PACU  Level of consciousness: awake  Complications: no  Multimodal analgesia pain management approach

## 2023-09-11 ENCOUNTER — TELEPHONE (OUTPATIENT)
Dept: SURGERY | Age: 48
End: 2023-09-11

## 2023-09-11 NOTE — TELEPHONE ENCOUNTER
Post Op Call    Patient is 3 days out from bilateral breast surgery     Patient states that he/she is fellin/10, reports feeling well. Any signs of Fevers greater than 101. 9? Chills? Redness? Warmth? Increased Swelling of tissue? no    Does patient have drains in place? no                   Any questions about drains? no    Does patient have vac in place? no                 Reinforce restrictions and use of compression/ dressings :yes, wearing.      Does patient have post op appt scheduled? yes, 2023

## 2023-09-11 NOTE — TELEPHONE ENCOUNTER
Called patient to check in after procedure on 9/8/2023. LVM for patient to call back for any post op questions/concerns.

## 2023-09-18 ENCOUNTER — OFFICE VISIT (OUTPATIENT)
Dept: SURGERY | Age: 48
End: 2023-09-18

## 2023-09-18 VITALS
SYSTOLIC BLOOD PRESSURE: 127 MMHG | WEIGHT: 223 LBS | OXYGEN SATURATION: 98 % | BODY MASS INDEX: 30.24 KG/M2 | DIASTOLIC BLOOD PRESSURE: 88 MMHG | HEART RATE: 84 BPM | TEMPERATURE: 98.4 F

## 2023-09-18 DIAGNOSIS — Z09 POSTOP CHECK: Primary | ICD-10-CM

## 2023-09-18 PROCEDURE — 99024 POSTOP FOLLOW-UP VISIT: CPT

## 2023-10-11 ENCOUNTER — OFFICE VISIT (OUTPATIENT)
Dept: SURGERY | Age: 48
End: 2023-10-11

## 2023-10-11 VITALS
SYSTOLIC BLOOD PRESSURE: 133 MMHG | DIASTOLIC BLOOD PRESSURE: 91 MMHG | BODY MASS INDEX: 29.84 KG/M2 | TEMPERATURE: 98.3 F | HEART RATE: 87 BPM | OXYGEN SATURATION: 98 % | WEIGHT: 220 LBS

## 2023-10-11 DIAGNOSIS — Z09 POSTOP CHECK: Primary | ICD-10-CM

## 2023-10-11 PROCEDURE — 99024 POSTOP FOLLOW-UP VISIT: CPT | Performed by: SURGERY

## 2023-12-20 NOTE — PROGRESS NOTES
[facility]   Electrophysiology  JARED Kirkpatrick  Attending EP: Dr. Olivares    Date: 1/19/2024  I had the privilege of visiting Divya Humphrey in the office.     Chief Complaint:   Chief Complaint   Patient presents with    Tachycardia     History of Present Illness: History obtained from patient and medical record.    Divya Humphrey is 48 y.o. female with a past medical history of breast cancer s/p mastectomy, aortic root/ascending aorta dilation, PVC, cardiomyopathy, and obesity. Hx of PVC ablation (3/17 at )    -Interval history: Today, Divya Humphrey is being seen for routine follow up. She is doing well. Pt denies any cardiac complaints. She remains in sinus rhythm with stable BP. Pt is scheduled for a CT next week. She remains on tamoxifen for breast cancer.     Denies having chest pain, palpitations, shortness of breath, orthopnea/PND, cough, or dizziness at the time of this visit.    With regard to medication therapy the patient has been compliant with prescribed regimen. They have tolerated therapy to date.     Allergies:  Allergies   Allergen Reactions    Morphine Anxiety     Reaction was a long time ago     Home Medications:  Prior to Visit Medications    Medication Sig Taking? Authorizing Provider   tamoxifen (NOLVADEX) 20 MG tablet TAKE 1 TABLET BY MOUTH DAILY WITH OR WITHOUT FOOD Yes Provider, MD Lawrence   carvedilol (COREG) 3.125 MG tablet TAKE 1 TABLET BY MOUTH TWICE DAILY WITH MEALS Yes Gadiel Wheeler APRN - CNP   enalapril (VASOTEC) 5 MG tablet TAKE 1 TABLET BY MOUTH DAILY Yes Ra Olivares MD      Past Medical History:  Past Medical History:   Diagnosis Date    Cancer (HCC)     breast    Hypertension     Spontaneous vaginal delivery     2006,2008     Past Surgical History:    has a past surgical history that includes knee surgery; fracture surgery; US BREAST BIOPSY W LOC DEVICE 1ST LESION RIGHT (07/20/2020); US PLACE BREAST LOC DEVICE 1ST LESION RIGHT

## 2024-01-19 ENCOUNTER — OFFICE VISIT (OUTPATIENT)
Dept: CARDIOLOGY CLINIC | Age: 49
End: 2024-01-19
Payer: COMMERCIAL

## 2024-01-19 ENCOUNTER — TELEPHONE (OUTPATIENT)
Dept: CARDIOLOGY CLINIC | Age: 49
End: 2024-01-19

## 2024-01-19 VITALS
WEIGHT: 233.1 LBS | HEIGHT: 72 IN | DIASTOLIC BLOOD PRESSURE: 72 MMHG | OXYGEN SATURATION: 98 % | SYSTOLIC BLOOD PRESSURE: 118 MMHG | HEART RATE: 70 BPM | BODY MASS INDEX: 31.57 KG/M2

## 2024-01-19 DIAGNOSIS — I51.9 LV DYSFUNCTION: ICD-10-CM

## 2024-01-19 DIAGNOSIS — I47.20 VENTRICULAR TACHYCARDIA (HCC): ICD-10-CM

## 2024-01-19 DIAGNOSIS — E66.09 CLASS 1 OBESITY DUE TO EXCESS CALORIES WITH SERIOUS COMORBIDITY AND BODY MASS INDEX (BMI) OF 32.0 TO 32.9 IN ADULT: ICD-10-CM

## 2024-01-19 DIAGNOSIS — I49.3 PVC (PREMATURE VENTRICULAR CONTRACTION): Primary | ICD-10-CM

## 2024-01-19 PROCEDURE — 93000 ELECTROCARDIOGRAM COMPLETE: CPT | Performed by: NURSE PRACTITIONER

## 2024-01-19 PROCEDURE — 99214 OFFICE O/P EST MOD 30 MIN: CPT | Performed by: NURSE PRACTITIONER

## 2024-01-19 NOTE — TELEPHONE ENCOUNTER
The patient saw NPSR today he wants her to return in 6 mo to see MXA. Phoned patient and LM to cb and schedule f/u w/MXA.

## 2024-01-22 ENCOUNTER — HOSPITAL ENCOUNTER (OUTPATIENT)
Dept: CT IMAGING | Age: 49
Discharge: HOME | End: 2024-01-22
Attending: INTERNAL MEDICINE
Payer: COMMERCIAL

## 2024-01-22 ENCOUNTER — TELEPHONE (OUTPATIENT)
Dept: SURGERY | Age: 49
End: 2024-01-22

## 2024-01-22 DIAGNOSIS — C50.212 MALIGNANT NEOPLASM OF UPPER-INNER QUADRANT OF LEFT FEMALE BREAST, UNSPECIFIED ESTROGEN RECEPTOR STATUS (HCC): ICD-10-CM

## 2024-01-22 PROCEDURE — 6360000004 HC RX CONTRAST MEDICATION: Performed by: INTERNAL MEDICINE

## 2024-01-22 PROCEDURE — 71260 CT THORAX DX C+: CPT

## 2024-01-22 RX ADMIN — IOPAMIDOL 75 ML: 755 INJECTION, SOLUTION INTRAVENOUS at 07:19

## 2024-01-22 NOTE — TELEPHONE ENCOUNTER
Left message to reschedule patient's 02/01/24 2:45 appointment with Dr. Cloud in . If/when patient returns call please reschedule her appointment to next available appointment. You may tranfer call to me.

## 2024-02-29 DIAGNOSIS — I42.0 DILATED CARDIOMYOPATHY (HCC): ICD-10-CM

## 2024-02-29 RX ORDER — ENALAPRIL MALEATE 5 MG/1
TABLET ORAL
Qty: 90 TABLET | Refills: 3 | Status: SHIPPED | OUTPATIENT
Start: 2024-02-29

## 2024-02-29 NOTE — TELEPHONE ENCOUNTER
Lov 1/19/2024  Labs no labs   Lrf 3/9/2023 Vasotec 5 mg disp 90+3  Appt no appt scheduled please advise

## 2024-04-01 RX ORDER — CARVEDILOL 3.12 MG/1
TABLET ORAL
Qty: 180 TABLET | Refills: 3 | Status: SHIPPED | OUTPATIENT
Start: 2024-04-01

## 2024-04-01 NOTE — TELEPHONE ENCOUNTER
Last OV : 1/19/24 npsr    Last EKG : 1/19/24     Last Refill : 7/5/23 120w2     Next OV :  none

## 2024-04-08 ENCOUNTER — OFFICE VISIT (OUTPATIENT)
Dept: SURGERY | Age: 49
End: 2024-04-08
Payer: COMMERCIAL

## 2024-04-08 VITALS
DIASTOLIC BLOOD PRESSURE: 90 MMHG | SYSTOLIC BLOOD PRESSURE: 135 MMHG | TEMPERATURE: 98 F | HEART RATE: 78 BPM | WEIGHT: 232.6 LBS | BODY MASS INDEX: 31.55 KG/M2 | OXYGEN SATURATION: 97 % | RESPIRATION RATE: 16 BRPM

## 2024-04-08 DIAGNOSIS — Z09 POSTOP CHECK: Primary | ICD-10-CM

## 2024-04-08 PROCEDURE — 99213 OFFICE O/P EST LOW 20 MIN: CPT | Performed by: SURGERY

## 2024-04-08 NOTE — PROGRESS NOTES
tremors, temperature intolerance or polydipsia.  Allergic/Immunologic: Negative for new environmental or food allergies.  Neurological: Negative for dizziness, seizures, speech difficulty, numbness.   Hematological: Negative for adenopathy.   Psychiatric/Behavioral: Negative for agitation and confusion.    EXAM    BP (!) 135/90 (Site: Left Upper Arm, Position: Sitting, Cuff Size: Medium Adult)   Pulse 78   Temp 98 °F (36.7 °C) (Infrared)   Resp 16   Wt 105.5 kg (232 lb 9.6 oz)   SpO2 97%   BMI 31.55 kg/m²      GEN: NAD   BREAST: Incisions healed well  Good contour but some deficiency in the superomedial aspect, though improved from prior  ABD: No contour abnormalities    IMP: 48 y.o.female s/p IBR  PLAN: Doing well, however she would like to have additional volume in the superomedial aspects. Will thus plan for OR for a second round of fat grafting.  Will submit to insurance and schedule.    R/B/A/O/P discussed in detail with the patient who agrees to proceed.     Corey Palacios MD  [facility]  [phone]  04/08/24

## 2024-04-09 ENCOUNTER — TELEPHONE (OUTPATIENT)
Dept: SURGERY | Age: 49
End: 2024-04-09

## 2024-04-09 RX ORDER — SODIUM CHLORIDE 0.9 % (FLUSH) 0.9 %
5-40 SYRINGE (ML) INJECTION EVERY 12 HOURS SCHEDULED
OUTPATIENT
Start: 2024-04-09

## 2024-04-09 RX ORDER — SODIUM CHLORIDE 0.9 % (FLUSH) 0.9 %
5-40 SYRINGE (ML) INJECTION PRN
OUTPATIENT
Start: 2024-04-09

## 2024-04-09 RX ORDER — SODIUM CHLORIDE 9 MG/ML
INJECTION, SOLUTION INTRAVENOUS PRN
OUTPATIENT
Start: 2024-04-09

## 2024-04-09 RX ORDER — SODIUM CHLORIDE 9 MG/ML
INJECTION, SOLUTION INTRAVENOUS CONTINUOUS
OUTPATIENT
Start: 2024-04-09

## 2024-04-09 NOTE — TELEPHONE ENCOUNTER
The patient was in the office to see Dr. Palacios yesterday.     PLAN: Doing well, however she would like to have additional volume in the superomedial aspects. Will thus plan for OR for a second round of fat grafting.  Will submit to insurance and schedule.     I received a surgery letter.     I spoke with the automated provider line at [facility] ([phone]) to see if CPT Code 40838 or 44074  require pre certification. Prior authorization is not required for this service. I requested a fax confirmation.     I sent an email to [facility] to find OR time.    I will leave this phone note open.

## 2024-04-11 ENCOUNTER — PREP FOR PROCEDURE (OUTPATIENT)
Dept: SURGERY | Age: 49
End: 2024-04-11

## 2024-04-11 DIAGNOSIS — Z98.890 S/P BREAST RECONSTRUCTION: ICD-10-CM

## 2024-04-11 PROBLEM — C50.912 MALIGNANT NEOPLASM OF LEFT BREAST (HCC): Status: ACTIVE | Noted: 2024-04-11

## 2024-04-11 NOTE — TELEPHONE ENCOUNTER
Likely related to substance use  Resolved with volume repletion   Patient returned the call mentioned below to discuss surgery scheduling.    Please call: [phone]

## 2024-04-11 NOTE — TELEPHONE ENCOUNTER
I lmom for the patient at the home number listed. I requested a call back discuss insurance and surgery scheduling.      I will leave this phone note open.

## 2024-04-11 NOTE — TELEPHONE ENCOUNTER
Patient called in to discuss insurance and surgery scheduling     Please contact the patient at [phone]

## 2024-04-19 NOTE — PROGRESS NOTES
4/19/2024 1206 PM:        [facility] PRE-SURGICAL TESTING INSTRUCTIONS                      PRIOR TO PROCEDURE DATE:    1. PLEASE FOLLOW ANY INSTRUCTIONS GIVEN TO YOU PER YOUR SURGEON.      2. Arrange for someone to drive you home and be with you for the first 24 hours after discharge for your safety after your procedure for which you received sedation. Ensure it is someone we can share information with regarding your discharge.     NOTE: At this time ONLY 2 ADULTS may accompany you. NO CHILDREN UNDER AGE OF 16.    One person ENCOURAGED to stay at hospital entire time if outpatient surgery      3. You must contact your surgeon for instructions IF:  You are taking any blood thinners, aspirin, anti-inflammatory or vitamins.   Contact your ordering physician/surgeon for medication instructions as soon as possible, especially if taking blood thinners, aspirin, heart, or diabetic medication. STOP SUPPLEMENTS/VITAMINS/NON-STEROIDAL ANTI-INFLAMMATORY MEDICATION 7 DAYS PRIOR TO PROCEDURE.  There is a change in your physical condition such as a cold, fever, rash, cuts, sores, or any other infection, especially near your surgical site.    4. Do not drink alcohol the day before or day of your procedure.  Do not use any recreational marijuana at least 24 hours or street drugs (heroin, cocaine) at minimum 5 days prior to your procedure.     5. A Pre-Surgical History and Physical MUST be completed WITHIN 30 DAYS OR LESS prior to your procedure.by your Physician or an Urgent Care        THE DAY OF YOUR PROCEDURE:  1.  Follow instructions for ARRIVAL TIME as DIRECTED BY YOUR SURGEON. [address] [address]     2. Enter the MAIN entrance from [facility] and follow the signs to the free Parking Garage or  Parking (offered free of charge 7 am-5pm).      3. Enter the Main Entrance of the hospital (do not enter from the lower level of the parking garage). Upon entrance, check in with  the operating room, your surgical site will be cleansed with a special soap, and in most cases, you will be given an antibiotic before the surgery begins.      What to expect AFTER your procedure?  1. Immediately following your procedure, your will be taken to the PACU for the first phase of your recovery.  Your nurse will help you recover from any potential side effects of anesthesia, such as extreme drowsiness, changes in your vital signs or breathing patterns. Nausea, headache, muscle aches, or sore throat may also occur after anesthesia.  Your nurse will help you manage these potential side effects.    2. For comfort and safety, arrange to have someone at home with you for the first 24 hours after discharge.    3. You and your family will be given written instructions about your diet, activity, dressing care, medications, and return visits.     4. Once at home, should issues with nausea, pain, or bleeding occur, or should you notice any signs of infection, you should call your surgeon.    5. Always clean your hands before and after caring for your wound. Do not let your family touch your surgery site without cleaning their hands.     6. Narcotic pain medications can cause significant constipation.  You may want to add a stool softener to your postoperative medication schedule or speak to your surgeon on how best to manage this SIDE EFFECT.    SPECIAL INSTRUCTIONS : NONE    Thank you for allowing us to care for you.  We strive to exceed your expectations in the delivery of care and service provided to you and your family.     If you need to contact the Pre-Admission Testing staff for any reason, please call us at [phone]. PRE OP HISTORY AND PHYSICAL CAN BE FAXED [phone].    Instructions reviewed with patient during preadmission testing phone interview.  Peyton Spear RN.4/19/2024 .12:06 PM      ADDITIONAL EDUCATIONAL INFORMATION REVIEWED PER PHONE WITH YOU AND/OR YOUR FAMILY:  No Hibiclens® Bathing

## 2024-04-19 NOTE — PROGRESS NOTES
4/19/24 1205 PM:    TI COMPLETED/TS    EKG TRACING IN EPIC 1/19/24, LABS AND H&P SCHEDULED FOR 4/24/24 W/D OJ KENROY/TS        PRE-OP ORDERS/CONSENT  ENTERED AND SIGNED  BY SURGEON/TS

## 2024-04-24 NOTE — H&P (VIEW-ONLY)
-------------------------------------------------------------------------------  Attestation signed by Ashvin Dunbar MD at 4/24/2024  9:19 AM  I was present with the medical student and have personally performed the physical exam and medical decision making. I have reviewed the medical student’s note and agree with their assessment and plan.  Ears TM clear  Oropharynx - moist mucosa, no erythema  Neck - no lymph nodes palpated. No thyromegaly.  Heart S1S2 no murmur regular rate  Lungs CTA  Abdomen - soft, no tenderness, liver and spleen not palpated.  Reflexes +2 symmetric  No pronator drift.  ECG NSR  1. Pre-op exam  PCN ECG 12-Lead (Non-MUSE)    CBC    Comprehensive Metabolic Panel, Serum    Protime-INR    APTT, NO ANTICOAGULANT    APTT, NO ANTICOAGULANT    Protime-INR    Comprehensive Metabolic Panel, Serum    CBC      2. S/P breast reconstruction          May proceed with surgery.  -------------------------------------------------------------------------------    Pt is a  48 y.o. year old female with PMH of premature ventricular contractions here for preoperative evaluation for breast reconstruction surgery.    History of Present Illness:  Patient reports no acute symptoms or concerns. Patient denies previous difficulties with anesthesia and denies any family history of difficulty with anesthesia.     Patient denies any symptoms of chest pain, palpitations, shortness of breath and states she does an elliptical for 12 miles each morning. Patient taking enalapril mg daily and coreg 3.125 mg bid.    Patient needs ECG, CBC, PT, Ptt, INR, CMP for preoperative clearance.     Review of Systems   Constitutional:  Negative for chills and fever.   Eyes:  Negative for blurred vision.   Respiratory:  Negative for shortness of breath.    Cardiovascular:  Negative for chest pain and palpitations.   Gastrointestinal:  Negative for abdominal pain, blood in stool, constipation, diarrhea, nausea and vomiting.   Genitourinary:   Negative for dysuria and hematuria.   Neurological:  Negative for headaches.     Past Medical History:   Diagnosis Date   • Breast cancer (CMS-HCC)    • PVC's (premature ventricular contractions)      Allergies   Allergen Reactions   • Morphine Other (See Comments)     Hallucinations        Vitals:    04/24/24 0830   BP: 100/76   Pulse: 78   SpO2: 97%       Physical Exam  Constitutional:       Appearance: Normal appearance.   HENT:      Head: Normocephalic and atraumatic.   Cardiovascular:      Rate and Rhythm: Normal rate and regular rhythm.   Pulmonary:      Effort: Pulmonary effort is normal. No respiratory distress.      Breath sounds: Normal breath sounds.   Skin:     General: Skin is warm and dry.   Neurological:      General: No focal deficit present.      Mental Status: She is alert and oriented to person, place, and time. Mental status is at baseline.   Psychiatric:         Mood and Affect: Mood normal.         Behavior: Behavior normal.         Thought Content: Thought content normal.         Judgment: Judgment normal.      ECG shows normal sinus rhythm.     Assessment & Plan    Preoperative Evaluation  Patient reports no acute concerns and no symptoms including chest palpitations, chest pain, shortness of breath. Normal sinus rhythm ECG, lack of symptoms, lack of personal/family difficulty with anesthesia, and patient's ability to elliptical 12 miles daily extremely reassuring.     History of premature ventricular contractions  Well controlled with enalapril and coreg, lack of symptoms reassuring. Continue enalapril and coreg as directed.     Nathaniel Elizondo

## 2024-04-26 ENCOUNTER — HOSPITAL ENCOUNTER (OUTPATIENT)
Age: 49
Setting detail: OUTPATIENT SURGERY
Discharge: HOME | End: 2024-04-26
Attending: SURGERY | Admitting: SURGERY
Payer: COMMERCIAL

## 2024-04-26 ENCOUNTER — ANESTHESIA EVENT (OUTPATIENT)
Dept: OPERATING ROOM | Age: 49
End: 2024-04-26
Payer: COMMERCIAL

## 2024-04-26 ENCOUNTER — ANESTHESIA (OUTPATIENT)
Dept: OPERATING ROOM | Age: 49
End: 2024-04-26
Payer: COMMERCIAL

## 2024-04-26 VITALS
RESPIRATION RATE: 18 BRPM | OXYGEN SATURATION: 96 % | TEMPERATURE: 97.8 F | BODY MASS INDEX: 31.21 KG/M2 | WEIGHT: 230.4 LBS | HEIGHT: 72 IN | HEART RATE: 65 BPM | DIASTOLIC BLOOD PRESSURE: 79 MMHG | SYSTOLIC BLOOD PRESSURE: 106 MMHG

## 2024-04-26 DIAGNOSIS — Z98.890 S/P BREAST RECONSTRUCTION: Primary | ICD-10-CM

## 2024-04-26 LAB — HCG UR QL: NEGATIVE

## 2024-04-26 PROCEDURE — 6370000000 HC RX 637 (ALT 250 FOR IP): Performed by: ANESTHESIOLOGY

## 2024-04-26 PROCEDURE — 2580000003 HC RX 258: Performed by: ANESTHESIOLOGY

## 2024-04-26 PROCEDURE — 3700000000 HC ANESTHESIA ATTENDED CARE: Performed by: SURGERY

## 2024-04-26 PROCEDURE — A4217 STERILE WATER/SALINE, 500 ML: HCPCS | Performed by: SURGERY

## 2024-04-26 PROCEDURE — 3700000001 HC ADD 15 MINUTES (ANESTHESIA): Performed by: SURGERY

## 2024-04-26 PROCEDURE — 2500000003 HC RX 250 WO HCPCS: Performed by: SURGERY

## 2024-04-26 PROCEDURE — 7100000001 HC PACU RECOVERY - ADDTL 15 MIN: Performed by: SURGERY

## 2024-04-26 PROCEDURE — 6360000002 HC RX W HCPCS: Performed by: SURGERY

## 2024-04-26 PROCEDURE — 2580000003 HC RX 258: Performed by: SURGERY

## 2024-04-26 PROCEDURE — 7100000010 HC PHASE II RECOVERY - FIRST 15 MIN: Performed by: SURGERY

## 2024-04-26 PROCEDURE — 3600000004 HC SURGERY LEVEL 4 BASE: Performed by: SURGERY

## 2024-04-26 PROCEDURE — 15771 GRFG AUTOL FAT LIPO 50 CC/<: CPT | Performed by: SURGERY

## 2024-04-26 PROCEDURE — 84703 CHORIONIC GONADOTROPIN ASSAY: CPT

## 2024-04-26 PROCEDURE — 3600000014 HC SURGERY LEVEL 4 ADDTL 15MIN: Performed by: SURGERY

## 2024-04-26 PROCEDURE — 2709999900 HC NON-CHARGEABLE SUPPLY: Performed by: SURGERY

## 2024-04-26 PROCEDURE — 6360000002 HC RX W HCPCS: Performed by: NURSE ANESTHETIST, CERTIFIED REGISTERED

## 2024-04-26 PROCEDURE — 7100000000 HC PACU RECOVERY - FIRST 15 MIN: Performed by: SURGERY

## 2024-04-26 PROCEDURE — 7100000011 HC PHASE II RECOVERY - ADDTL 15 MIN: Performed by: SURGERY

## 2024-04-26 PROCEDURE — 15772 GRFG AUTOL FAT LIPO EA ADDL: CPT | Performed by: SURGERY

## 2024-04-26 PROCEDURE — 2500000003 HC RX 250 WO HCPCS: Performed by: NURSE ANESTHETIST, CERTIFIED REGISTERED

## 2024-04-26 RX ORDER — OXYCODONE HYDROCHLORIDE AND ACETAMINOPHEN 5; 325 MG/1; MG/1
1 TABLET ORAL EVERY 6 HOURS PRN
Qty: 28 TABLET | Refills: 0 | Status: SHIPPED | OUTPATIENT
Start: 2024-04-26 | End: 2024-05-03

## 2024-04-26 RX ORDER — LABETALOL HYDROCHLORIDE 5 MG/ML
10 INJECTION, SOLUTION INTRAVENOUS
Status: DISCONTINUED | OUTPATIENT
Start: 2024-04-26 | End: 2024-04-26 | Stop reason: HOSPADM

## 2024-04-26 RX ORDER — SODIUM CHLORIDE 0.9 % (FLUSH) 0.9 %
5-40 SYRINGE (ML) INJECTION PRN
Status: DISCONTINUED | OUTPATIENT
Start: 2024-04-26 | End: 2024-04-26 | Stop reason: HOSPADM

## 2024-04-26 RX ORDER — FENTANYL CITRATE 50 UG/ML
25 INJECTION, SOLUTION INTRAMUSCULAR; INTRAVENOUS EVERY 5 MIN PRN
Status: DISCONTINUED | OUTPATIENT
Start: 2024-04-26 | End: 2024-04-26 | Stop reason: HOSPADM

## 2024-04-26 RX ORDER — CEPHALEXIN 500 MG/1
500 CAPSULE ORAL 4 TIMES DAILY
Qty: 40 CAPSULE | Refills: 0 | Status: SHIPPED | OUTPATIENT
Start: 2024-04-26 | End: 2024-05-06

## 2024-04-26 RX ORDER — SODIUM CHLORIDE 9 MG/ML
INJECTION, SOLUTION INTRAVENOUS PRN
Status: DISCONTINUED | OUTPATIENT
Start: 2024-04-26 | End: 2024-04-26 | Stop reason: HOSPADM

## 2024-04-26 RX ORDER — HYDROMORPHONE HYDROCHLORIDE 1 MG/ML
0.5 INJECTION, SOLUTION INTRAMUSCULAR; INTRAVENOUS; SUBCUTANEOUS EVERY 5 MIN PRN
Status: DISCONTINUED | OUTPATIENT
Start: 2024-04-26 | End: 2024-04-26 | Stop reason: HOSPADM

## 2024-04-26 RX ORDER — IPRATROPIUM BROMIDE AND ALBUTEROL SULFATE 2.5; .5 MG/3ML; MG/3ML
1 SOLUTION RESPIRATORY (INHALATION)
Status: DISCONTINUED | OUTPATIENT
Start: 2024-04-26 | End: 2024-04-26 | Stop reason: HOSPADM

## 2024-04-26 RX ORDER — ROCURONIUM BROMIDE 10 MG/ML
INJECTION, SOLUTION INTRAVENOUS PRN
Status: DISCONTINUED | OUTPATIENT
Start: 2024-04-26 | End: 2024-04-26 | Stop reason: SDUPTHER

## 2024-04-26 RX ORDER — ONDANSETRON 2 MG/ML
INJECTION INTRAMUSCULAR; INTRAVENOUS PRN
Status: DISCONTINUED | OUTPATIENT
Start: 2024-04-26 | End: 2024-04-26 | Stop reason: SDUPTHER

## 2024-04-26 RX ORDER — SODIUM CHLORIDE 0.9 % (FLUSH) 0.9 %
5-40 SYRINGE (ML) INJECTION EVERY 12 HOURS SCHEDULED
Status: DISCONTINUED | OUTPATIENT
Start: 2024-04-26 | End: 2024-04-26 | Stop reason: HOSPADM

## 2024-04-26 RX ORDER — SODIUM CHLORIDE 9 MG/ML
INJECTION, SOLUTION INTRAVENOUS CONTINUOUS
Status: DISCONTINUED | OUTPATIENT
Start: 2024-04-26 | End: 2024-04-26 | Stop reason: HOSPADM

## 2024-04-26 RX ORDER — SODIUM CHLORIDE, SODIUM LACTATE, POTASSIUM CHLORIDE, CALCIUM CHLORIDE 600; 310; 30; 20 MG/100ML; MG/100ML; MG/100ML; MG/100ML
INJECTION, SOLUTION INTRAVENOUS CONTINUOUS
Status: DISCONTINUED | OUTPATIENT
Start: 2024-04-26 | End: 2024-04-26 | Stop reason: HOSPADM

## 2024-04-26 RX ORDER — METHOCARBAMOL 100 MG/ML
INJECTION, SOLUTION INTRAMUSCULAR; INTRAVENOUS PRN
Status: DISCONTINUED | OUTPATIENT
Start: 2024-04-26 | End: 2024-04-26 | Stop reason: SDUPTHER

## 2024-04-26 RX ORDER — PROCHLORPERAZINE EDISYLATE 5 MG/ML
5 INJECTION INTRAMUSCULAR; INTRAVENOUS
Status: DISCONTINUED | OUTPATIENT
Start: 2024-04-26 | End: 2024-04-26 | Stop reason: HOSPADM

## 2024-04-26 RX ORDER — NALOXONE HYDROCHLORIDE 0.4 MG/ML
INJECTION, SOLUTION INTRAMUSCULAR; INTRAVENOUS; SUBCUTANEOUS PRN
Status: DISCONTINUED | OUTPATIENT
Start: 2024-04-26 | End: 2024-04-26 | Stop reason: HOSPADM

## 2024-04-26 RX ORDER — PHENYLEPHRINE HYDROCHLORIDE 10 MG/ML
INJECTION INTRAVENOUS PRN
Status: DISCONTINUED | OUTPATIENT
Start: 2024-04-26 | End: 2024-04-26 | Stop reason: SDUPTHER

## 2024-04-26 RX ORDER — MAGNESIUM HYDROXIDE 1200 MG/15ML
LIQUID ORAL CONTINUOUS PRN
Status: DISCONTINUED | OUTPATIENT
Start: 2024-04-26 | End: 2024-04-26 | Stop reason: HOSPADM

## 2024-04-26 RX ORDER — FAMOTIDINE 10 MG/ML
INJECTION, SOLUTION INTRAVENOUS PRN
Status: DISCONTINUED | OUTPATIENT
Start: 2024-04-26 | End: 2024-04-26 | Stop reason: SDUPTHER

## 2024-04-26 RX ORDER — HYDROMORPHONE HYDROCHLORIDE 2 MG/ML
INJECTION, SOLUTION INTRAMUSCULAR; INTRAVENOUS; SUBCUTANEOUS PRN
Status: DISCONTINUED | OUTPATIENT
Start: 2024-04-26 | End: 2024-04-26 | Stop reason: SDUPTHER

## 2024-04-26 RX ORDER — ONDANSETRON 2 MG/ML
4 INJECTION INTRAMUSCULAR; INTRAVENOUS
Status: DISCONTINUED | OUTPATIENT
Start: 2024-04-26 | End: 2024-04-26 | Stop reason: HOSPADM

## 2024-04-26 RX ORDER — LORAZEPAM 2 MG/ML
0.5 INJECTION INTRAMUSCULAR
Status: DISCONTINUED | OUTPATIENT
Start: 2024-04-26 | End: 2024-04-26 | Stop reason: HOSPADM

## 2024-04-26 RX ORDER — DIPHENHYDRAMINE HYDROCHLORIDE 50 MG/ML
12.5 INJECTION INTRAMUSCULAR; INTRAVENOUS
Status: DISCONTINUED | OUTPATIENT
Start: 2024-04-26 | End: 2024-04-26 | Stop reason: HOSPADM

## 2024-04-26 RX ORDER — LIDOCAINE HYDROCHLORIDE 20 MG/ML
INJECTION, SOLUTION INTRAVENOUS PRN
Status: DISCONTINUED | OUTPATIENT
Start: 2024-04-26 | End: 2024-04-26 | Stop reason: SDUPTHER

## 2024-04-26 RX ORDER — METOCLOPRAMIDE HYDROCHLORIDE 5 MG/ML
INJECTION INTRAMUSCULAR; INTRAVENOUS PRN
Status: DISCONTINUED | OUTPATIENT
Start: 2024-04-26 | End: 2024-04-26 | Stop reason: SDUPTHER

## 2024-04-26 RX ORDER — PROPOFOL 10 MG/ML
INJECTION, EMULSION INTRAVENOUS PRN
Status: DISCONTINUED | OUTPATIENT
Start: 2024-04-26 | End: 2024-04-26 | Stop reason: SDUPTHER

## 2024-04-26 RX ORDER — MEPERIDINE HYDROCHLORIDE 25 MG/ML
12.5 INJECTION INTRAMUSCULAR; INTRAVENOUS; SUBCUTANEOUS EVERY 5 MIN PRN
Status: DISCONTINUED | OUTPATIENT
Start: 2024-04-26 | End: 2024-04-26 | Stop reason: HOSPADM

## 2024-04-26 RX ADMIN — METOCLOPRAMIDE 10 MG: 5 INJECTION, SOLUTION INTRAMUSCULAR; INTRAVENOUS at 12:59

## 2024-04-26 RX ADMIN — PHENYLEPHRINE HYDROCHLORIDE 100 MCG: 10 INJECTION, SOLUTION INTRAVENOUS at 13:04

## 2024-04-26 RX ADMIN — LIDOCAINE HYDROCHLORIDE 100 MG: 20 INJECTION, SOLUTION INTRAVENOUS at 12:54

## 2024-04-26 RX ADMIN — PROPOFOL 200 MG: 10 INJECTION, EMULSION INTRAVENOUS at 12:54

## 2024-04-26 RX ADMIN — SUGAMMADEX 200 MG: 100 INJECTION, SOLUTION INTRAVENOUS at 13:39

## 2024-04-26 RX ADMIN — SODIUM CHLORIDE, POTASSIUM CHLORIDE, SODIUM LACTATE AND CALCIUM CHLORIDE: 600; 310; 30; 20 INJECTION, SOLUTION INTRAVENOUS at 11:59

## 2024-04-26 RX ADMIN — ONDANSETRON 8 MG: 2 INJECTION INTRAMUSCULAR; INTRAVENOUS at 12:53

## 2024-04-26 RX ADMIN — FAMOTIDINE 20 MG: 10 INJECTION, SOLUTION INTRAVENOUS at 12:53

## 2024-04-26 RX ADMIN — SODIUM CHLORIDE, POTASSIUM CHLORIDE, SODIUM LACTATE AND CALCIUM CHLORIDE: 600; 310; 30; 20 INJECTION, SOLUTION INTRAVENOUS at 12:57

## 2024-04-26 RX ADMIN — METHOCARBAMOL 1000 MG: 100 INJECTION, SOLUTION INTRAMUSCULAR; INTRAVENOUS at 12:57

## 2024-04-26 RX ADMIN — PHENYLEPHRINE HYDROCHLORIDE 100 MCG: 10 INJECTION, SOLUTION INTRAVENOUS at 13:00

## 2024-04-26 RX ADMIN — HYDROMORPHONE HYDROCHLORIDE 2 MG: 2 INJECTION, SOLUTION INTRAMUSCULAR; INTRAVENOUS; SUBCUTANEOUS at 12:54

## 2024-04-26 RX ADMIN — TRANEXAMIC ACID 1000 MG: 100 INJECTION, SOLUTION INTRAVENOUS at 13:07

## 2024-04-26 RX ADMIN — BENZOCAINE 6 MG-MENTHOL 10 MG LOZENGES 1 LOZENGE: at 14:14

## 2024-04-26 RX ADMIN — ROCURONIUM BROMIDE 100 MG: 10 INJECTION, SOLUTION INTRAVENOUS at 12:55

## 2024-04-26 RX ADMIN — WATER 2000 MG: 1 INJECTION INTRAMUSCULAR; INTRAVENOUS; SUBCUTANEOUS at 13:00

## 2024-04-26 ASSESSMENT — PAIN - FUNCTIONAL ASSESSMENT
PAIN_FUNCTIONAL_ASSESSMENT: 0-10
PAIN_FUNCTIONAL_ASSESSMENT: 0-10

## 2024-04-26 ASSESSMENT — PAIN DESCRIPTION - DESCRIPTORS
DESCRIPTORS: OTHER (COMMENT)
DESCRIPTORS: DISCOMFORT

## 2024-04-26 ASSESSMENT — PAIN SCALES - GENERAL: PAINLEVEL_OUTOF10: 4

## 2024-04-26 NOTE — ANESTHESIA PRE PROCEDURE
Department of Anesthesiology  Preprocedure Note       Name:  Divya Humphrey   Age:  48 y.o.  :  1975                                          MRN:  [id number]         Date:  2024      Surgeon: Surgeon(s):  Jeffrey Palacios MD    Procedure: Procedure(s):  Bilateral Breast Fat Grafting    Medications prior to admission:   Prior to Admission medications    Medication Sig Start Date End Date Taking? Authorizing Provider   carvedilol (COREG) 3.125 MG tablet TAKE 1 TABLET BY MOUTH TWICE DAILY WITH MEALS 24   Gadiel Wheeler APRN - CNP   enalapril (VASOTEC) 5 MG tablet TAKE 1 TABLET BY MOUTH DAILY 24   Ra Olivares MD   tamoxifen (NOLVADEX) 20 MG tablet TAKE 1 TABLET BY MOUTH DAILY WITH OR WITHOUT FOOD 23   Provider, MD Lawrence       Current medications:    No current facility-administered medications for this encounter.       Allergies:    Allergies   Allergen Reactions   • Morphine Anxiety     Reaction was a long time ago       Problem List:    Patient Active Problem List   Diagnosis Code   • Status post induction of labor Z98.890   • Spontaneous vaginal delivery O80   • Benign neoplasm of skin D23.9   • PVC (premature ventricular contraction) I49.3   • Ventricular tachycardia (HCC) I47.20   • LV dysfunction I51.9   • Class 1 obesity due to excess calories with serious comorbidity and body mass index (BMI) of 32.0 to 32.9 in adult E66.09, Z68.32   • Cancer (HCC) C80.1   • Post-op pain G89.18   • Malignant neoplasm of female breast (HCC) C50.919   • Malignant neoplasm of left breast (HCC) C50.912   • S/P breast reconstruction Z98.890       Past Medical History:        Diagnosis Date   • Cancer (HCC)     breast   • Spontaneous vaginal delivery     ,       Past Surgical History:        Procedure Laterality Date   • BREAST ENHANCEMENT SURGERY Bilateral 2023    BILATERAL BREAST RECONSTRUCTION WITH  STAGE 1 TISSUE EXPANDER PLACEMENT WITH ALLODERM performed by Jeffrey Palacios MD

## 2024-04-26 NOTE — ANESTHESIA POSTPROCEDURE EVALUATION
Department of Anesthesiology  Postprocedure Note    Patient: Divya Humphrey  MRN: [id number]  YOB: 1975  Date of evaluation: 4/26/2024    Procedure Summary       Date: 04/26/24 Room / Location: [address]    Anesthesia Start: 1252 Anesthesia Stop: 1346    Procedure: Bilateral Breast Fat Grafting (Bilateral: Breast) Diagnosis:       Malignant neoplasm of left breast (HCC)      S/P breast reconstruction      (Malignant neoplasm of left breast (HCC) [C50.912])      (S/P breast reconstruction [Z98.890])    Surgeons: Jeffrey Palacios MD Responsible Provider: Elias Fitzpatrick MD    Anesthesia Type: general ASA Status: 2            Anesthesia Type: No value filed.    Radha Phase I: Radha Score: 10    Radha Phase II:      Anesthesia Post Evaluation    Patient location during evaluation: PACU  Patient participation: complete - patient participated  Level of consciousness: awake  Pain score: 0  Nausea & Vomiting: no nausea and no vomiting  Cardiovascular status: blood pressure returned to baseline  Respiratory status: acceptable  Hydration status: euvolemic  Pain management: adequate    No notable events documented.

## 2024-04-26 NOTE — PROGRESS NOTES
Patient arrived calm and alert no CO pain or nausea   Bilateral Breast Fat Grafting - Bilateral  General

## 2024-04-26 NOTE — DISCHARGE INSTRUCTIONS
[facility]    Corey Palacios MD  [address] [facility] ([address])  [place] [address] [phone]    Post-op Instructions  ___________________________________________    Breast Reconstruction with Fat Grafting    The following instructions will help you know what to expect in the days following your surgery. Do not, however, hesitate to call if you have questions or concerns.    Activities    - Sleep in a flexed position with your head and shoulders elevated. Keep pillows under your knees for the first few days. You can resume your normal sleeping position when comfortable.   - Driving is prohibited for 1 to 2 weeks. Do not drive while taking pain medications.   - Avoid heavy lifting (no more than 5 pounds) and vigorous use of your arms for the first 3 weeks.   - Do not engage in sports, aerobics, or vacuuming.   - Start arm raising exercises gently within 1 week of surgery. This will be discussed at your follow-up appointment.         - Avoid heavy lifting >5 lb, bending, pushing, pulling, or straining   - Smoking (or ANY nicotine containing product) is prohibited for a minimum of 6 weeks as it interferes with healing and can lead to significant - and avoidable - complications  - You may need to use fiberfill or other padding on the opposite breast to maintain symmetry in clothing. Shoulder pads sometimes work nicely too.     Diet  - Resume your preoperative diet as tolerated.     Pain Control/Medications  - You will receive a prescription for pain medication that can be taken as directed if needed for pain control. The pain medication may cause constipation and does impair your ability to drive or make important decisions.          - You may also be given a muscle relaxant that can help with muscle spasms. Do                  not take the pain medication and muscle relaxant at the same time        - There is absolutely NO driving while on pain medication or Valium® or

## 2024-04-26 NOTE — PROGRESS NOTES
PACU Transfer to [facility]    Vitals:    04/26/24 1430   BP: 109/78   Pulse: 69   Resp: 17   Temp: 97.7 °F (36.5 °C)   SpO2: 97%     Patient denies nausea, wants to go home.    Intake/Output Summary (Last 24 hours) at 4/26/2024 1443  Last data filed at 4/26/2024 1346  Gross per 24 hour   Intake 1860 ml   Output --   Net 1860 ml       Pain assessment:  not receiving treatment  Pain Level: 4    Patient transferred to care of [facility] RN. Patient transported by stretcher to [facility] #6 by Alisa hwang transporter.    4/26/2024 2:43 PM

## 2024-04-26 NOTE — PROGRESS NOTES
Ambulatory Surgery/Procedure Discharge Note  Pt alert and stable  surgical bra intact and drsg inside bra clean dry and intact  Abd Binder in place  2 Rxs called in to pharmacy  Verbal and written discharge instructions given to pt and   IV dcd , fluids taken well  Up getting dressed with help  Denies having to void   Dcd per wheelchair to car with  present    Vitals:    04/26/24 1445   BP: 106/79   Pulse: 65   Resp: 18   Temp: 97.8 °F (36.6 °C)   SpO2: 96%       In: 1928 [I.V.:1868]  Out: 0     Restroom use offered before discharge.  Yes    Pain assessment:  level of pain (1-10, 10 severe),   Pain Level: 3        Patient discharged to home/self care. Patient discharged via wheel chair by transporter to waiting family/S.O.       4/26/2024 3:10 PM

## 2024-04-26 NOTE — OP NOTE
[facility]     OPERATIVE DICTATION    NAME: Divya Humphrey   MRN: [id number]  DATE: 4/26/2024     AGE: 48 y.o.    SURGEON: Jeffrey Palacios MD  ASSISTANT: Vivienne Neves ()     PREOPERATIVE DIAGNOSIS: Acquired absence bilateral breast, status post implant reconstruction  POSTOPERATIVE DIAGNOSIS: Same     OPERATION: 1) Bilateral breast fat grafting    ANESTHESIA: General     ESTIMATED BLOOD LOSS: <50 mL    OPERATIVE INDICATIONS: This is a 48 y.o. female who underwent mastectomy with staged reconstruction approximately 7 months ago. She did well, but noted to have some deficiency superiorly on both sides and wanted improvement. Given this, she presents back for fat grafting. She had an umbilical hernia on examination and we discussed that we would not liposuction in this area to decrease injury. The plan of surgery, risks, benefits, alternatives, indications, limitations, possible complications, and future surgeries were discussed with the patient and she agreed to proceed.     OPERATIVE PROCEDURE: The patient was marked in the standing position in the preoperative holding area.  She was then brought to the operating room and placed in the supine position on the operating table. After satisfactory induction with general endotracheal anesthesia, the patient was then prepped and draped in the usual sterile fashion. The case began by infiltrating tumescent solution into the abdomen. After a satisfactory amount of time for epinephrine effect, liposuction was performed using a Revolve system. A total of 170 cc of lipoaspirate was utilized for fat grafting to the bilateral breasts. After the injection was completed, the sites were then closed using 5-0 Fast Gut. Dressings were then applied.    The patient was then awakened, extubated, and taken to the PACU in stable condition.  At the end of the case, all counts were correct and there were no immediate complications.  The patient  tolerated the procedure well.    DRAINS: None    SPECIMEN: None    CONDITION: Stable    Jeffrey Palacios MD

## 2024-04-26 NOTE — BRIEF OP NOTE
Brief Postoperative Note      Patient: Divya Humphrey  YOB: 1975  MRN: [id number]    Date of Procedure: 4/26/2024    Pre-Op Diagnosis Codes:     * Malignant neoplasm of left breast (HCC) [C50.912]     * S/P breast reconstruction [Z98.890]    Post-Op Diagnosis: Same       Procedure(s):  Bilateral Breast Fat Grafting    Surgeon(s):  Jeffrey Palacios MD    Assistant:  Surgical Assistant: Vivienne Neves    Anesthesia: General    Estimated Blood Loss (mL): less than 50     Complications: None    Specimens:   * No specimens in log *    Implants:  * No implants in log *      Drains:   [REMOVED] Urinary Catheter 04/26/24 2 Way (Removed)       Findings:  Infection Present At Time Of Surgery (PATOS) (choose all levels that have infection present):  No infection present  Other Findings: 180cc fat grafting    Electronically signed by Jeffrey Palacios MD on 4/26/2024 at 2:24 PM

## 2024-05-06 ENCOUNTER — OFFICE VISIT (OUTPATIENT)
Dept: SURGERY | Age: 49
End: 2024-05-06

## 2024-05-06 VITALS
BODY MASS INDEX: 31.46 KG/M2 | TEMPERATURE: 98 F | WEIGHT: 232 LBS | SYSTOLIC BLOOD PRESSURE: 132 MMHG | DIASTOLIC BLOOD PRESSURE: 95 MMHG | OXYGEN SATURATION: 95 % | HEART RATE: 80 BPM | RESPIRATION RATE: 16 BRPM

## 2024-05-06 DIAGNOSIS — Z09 POSTOP CHECK: Primary | ICD-10-CM

## 2024-05-06 PROCEDURE — 99024 POSTOP FOLLOW-UP VISIT: CPT | Performed by: SURGERY

## 2024-05-06 NOTE — PROGRESS NOTES
[facility]    PROCEDURE: 1) Bilateral breast fat grafting   DATE: 4/26/24    Divya Humphrey has been recovering well since her procedure. Pain has been well controlled without pain medications.     EXAM    BP (!) 132/95 (Site: Left Upper Arm, Position: Sitting, Cuff Size: Large Adult)   Pulse 80   Temp 98 °F (36.7 °C) (Infrared)   Resp 16   Wt 105.2 kg (232 lb)   LMP  (LMP Unknown)   SpO2 95%   BMI 31.46 kg/m²      GEN: NAD   BREAST: Incisions well healed  Improved contour  ABD: Good contour    IMP: 48 y.o.female s/p bilateral breast fat grafting  PLAN: Doing well. She is happy with her results thus far.  Will follow-up in 6 months.    Corey Palacios MD  [facility]  [phone]  05/06/24

## 2024-06-10 ENCOUNTER — OFFICE VISIT (OUTPATIENT)
Dept: SURGERY | Age: 49
End: 2024-06-10

## 2024-06-10 VITALS
WEIGHT: 224 LBS | HEIGHT: 72 IN | BODY MASS INDEX: 30.34 KG/M2 | RESPIRATION RATE: 16 BRPM | HEART RATE: 80 BPM | DIASTOLIC BLOOD PRESSURE: 89 MMHG | SYSTOLIC BLOOD PRESSURE: 126 MMHG

## 2024-06-10 DIAGNOSIS — Z09 POSTOP CHECK: Primary | ICD-10-CM

## 2024-06-10 PROCEDURE — 99024 POSTOP FOLLOW-UP VISIT: CPT | Performed by: SURGERY

## 2024-06-10 NOTE — PROGRESS NOTES
[facility]    PROCEDURE: 1) Bilateral breast fat grafting   DATE: 4/26/24    Divya Humphrey has been recovering well since her procedure. Pain has been well controlled without pain medications.     EXAM    /89 (Site: Right Upper Arm, Position: Sitting, Cuff Size: Medium Adult)   Pulse 80   Resp 16   Ht 1.829 m (6')   Wt 101.6 kg (224 lb)   BMI 30.38 kg/m²      GEN: NAD   BREAST: Incisions well healed  Improved contour  ABD: Good contour    IMP: 48 y.o.female s/p bilateral breast fat grafting  PLAN: Doing well. She is happy with her results thus far.  Will follow-up in 1 year.    Corey Palacios MD  [facility]  [phone]  06/10/24

## 2024-10-10 ENCOUNTER — TELEPHONE (OUTPATIENT)
Dept: SURGERY | Age: 49
End: 2024-10-10

## 2024-10-10 NOTE — TELEPHONE ENCOUNTER
Called and spoke with the patient and scheduled her for a office visit with Dr. Palacios on 10/16/2024. Patient verbalized understanding.

## 2024-10-10 NOTE — TELEPHONE ENCOUNTER
Bilateral Breast Fat Grafting [[id number]] was completed on 04/26/2024.     Patient states she previously had an issue with her left breast and she believes the same issue is reoccurring, but this time the difference is more notable. Patient states her left breast is drastically deflated on the top and is noticeable even in clothing.     Patient states she is not experiencing any pain, redness, swelling, fever, nausea, and/or vomiting at this time.     Patient can be reached at [phone]

## 2024-10-22 NOTE — PROGRESS NOTES
[facility]    PROCEDURE: 1) Bilateral breast fat grafting   DATE: 4/26/24    Divya Humphrey has been recovering well since her procedure. Pain has been well controlled without pain medications.     PMHx:   Past Medical History:   Diagnosis Date    Cancer (HCC)     breast    Spontaneous vaginal delivery     2006,2008     PSHx:   Past Surgical History:   Procedure Laterality Date    BREAST ENHANCEMENT SURGERY Bilateral 4/4/2023    BILATERAL BREAST RECONSTRUCTION WITH  STAGE 1 TISSUE EXPANDER PLACEMENT WITH ALLODERM performed by Jeffrey Palacios MD at [facility] OR    BREAST SURGERY Right 08/18/2020    RIGHT BREAST RADIOFREQUENCY TAGGED EXCISIONAL BREAST BIOPSY performed by Jess Ge MD at [facility] OR    BREAST SURGERY      lump removed    BREAST SURGERY Bilateral 9/8/2023    EXCHANGE FOR PERMANENT IMPLANTS BILATERAL BREASTS, BILATERAL BREAST FAT GRAFTING performed by Jeffrey Palacios MD at [facility] OR    CARDIAC SURGERY      ablasion for irreg HB    CATARACT REMOVAL WITH IMPLANT      FAT TRANSFER Bilateral 9/8/2023    . performed by Jeffrey Palacios MD at [facility] OR    FAT TRANSFER Bilateral 4/26/2024    Bilateral Breast Fat Grafting performed by Jeffrey Palacios MD at [facility] OR    FRACTURE SURGERY      leg    KNEE SURGERY      CONNIE STEROTACTIC LOC BREAST BIOPSY LEFT Left 01/19/2023    CONNIE STEROTACTIC LOC BREAST BIOPSY LEFT 1/19/2023 [facility]    MASTECTOMY Bilateral 4/4/2023    BILATERAL SKIN SPARING MASTECTOMY, LEFT SENTINEL LYMPH NODE BIOPSY performed by Temi Cloud MD at [facility] OR    US BREAST BIOPSY W LOC DEVICE 1ST LESION LEFT Left 01/19/2023    US BREAST BIOPSY W LOC DEVICE 1ST LESION LEFT 1/19/2023 [facility]    US BREAST BIOPSY W LOC DEVICE 1ST LESION RIGHT  07/20/2020    US BREAST NEEDLE BIOPSY RIGHT 7/20/2020 [facility]    US GUIDED NEEDLE LOC OF RIGHT BREAST  08/17/2020    US GUIDED NEEDLE LOC OF RIGHT BREAST 8/17/2020 Nely Dillon MD [facility]

## 2024-10-23 ENCOUNTER — OFFICE VISIT (OUTPATIENT)
Dept: SURGERY | Age: 49
End: 2024-10-23
Payer: COMMERCIAL

## 2024-10-23 VITALS
BODY MASS INDEX: 30.28 KG/M2 | RESPIRATION RATE: 16 BRPM | HEART RATE: 81 BPM | HEIGHT: 72 IN | OXYGEN SATURATION: 99 % | DIASTOLIC BLOOD PRESSURE: 85 MMHG | SYSTOLIC BLOOD PRESSURE: 119 MMHG | WEIGHT: 223.6 LBS

## 2024-10-23 DIAGNOSIS — Z09 POSTOP CHECK: Primary | ICD-10-CM

## 2024-10-23 PROCEDURE — 99213 OFFICE O/P EST LOW 20 MIN: CPT | Performed by: SURGERY

## 2025-01-31 NOTE — PROGRESS NOTES
Provider, Lawrence, MD       Allergies   Allergen Reactions    Morphine Anxiety     Reaction was a long time ago       Social History:  Reviewed.  reports that she has never smoked. She has never used smokeless tobacco. She reports current alcohol use of about 3.0 standard drinks of alcohol per week. She reports that she does not use drugs.     Family History:  Reviewed. Reviewed. No family history of SCD.      Relevant and available labs, and cardiovascular diagnostics reviewed.   Reviewed.     I independently reviewed relevant and available cardiac diagnostic tests ECG, CXR, Echo, Stress test, Device interrogation, Holter, CT scan.    Outside medical records via Care everywhere reviewed and summarized in H&P above.    Complex medical condition with multiple medical problems affecting prognosis and outcome of EP interventions       - The patient is counseled to follow a low salt diet to assure blood pressure remains controlled for cardiovascular risk factor modification.   - The patient is counseled to avoid excess caffeine, and energy drinks as this may exacerbated ectopy and arrhythmia.   - The patient is counseled to get regular exercise 3-5 times per week to control cardiovascular risk factors.   - The patient is counseled to lose weigt to control cardiovascular risk factors.          All questions and concerns were addressed to the patient/family. Alternatives to my treatment were discussed. I have discussed the above stated plan and the patient verbalized understanding and agreed with the plan.    Scribe attestation: This note was scribed in the presence of Ra Olivares MD by Judy Walker RN    I, Dr. Ra Olivares personally performed the services described in this documentation as scribed by RN in my presence, and it is both accurate and complete.         NOTE: This report was transcribed using voice recognition software. Every effort was made to ensure accuracy, however, inadvertent computerized

## 2025-02-04 ENCOUNTER — OFFICE VISIT (OUTPATIENT)
Dept: CARDIOLOGY CLINIC | Age: 50
End: 2025-02-04
Payer: COMMERCIAL

## 2025-02-04 VITALS
WEIGHT: 226 LBS | DIASTOLIC BLOOD PRESSURE: 72 MMHG | SYSTOLIC BLOOD PRESSURE: 124 MMHG | HEART RATE: 72 BPM | HEIGHT: 72 IN | BODY MASS INDEX: 30.61 KG/M2 | OXYGEN SATURATION: 98 %

## 2025-02-04 DIAGNOSIS — I49.3 PVC (PREMATURE VENTRICULAR CONTRACTION): Primary | ICD-10-CM

## 2025-02-04 DIAGNOSIS — I51.9 LV DYSFUNCTION: ICD-10-CM

## 2025-02-04 PROCEDURE — 93000 ELECTROCARDIOGRAM COMPLETE: CPT | Performed by: INTERNAL MEDICINE

## 2025-02-04 PROCEDURE — 99214 OFFICE O/P EST MOD 30 MIN: CPT | Performed by: INTERNAL MEDICINE

## 2025-02-19 ENCOUNTER — HOSPITAL ENCOUNTER (OUTPATIENT)
Age: 50
Discharge: HOME | End: 2025-02-21
Attending: INTERNAL MEDICINE
Payer: COMMERCIAL

## 2025-02-19 VITALS
HEIGHT: 72 IN | SYSTOLIC BLOOD PRESSURE: 118 MMHG | WEIGHT: 226 LBS | BODY MASS INDEX: 30.61 KG/M2 | DIASTOLIC BLOOD PRESSURE: 84 MMHG

## 2025-02-19 DIAGNOSIS — I49.3 PVC (PREMATURE VENTRICULAR CONTRACTION): ICD-10-CM

## 2025-02-19 DIAGNOSIS — I51.9 LV DYSFUNCTION: ICD-10-CM

## 2025-02-19 LAB
ECHO AO ASC DIAM: 3.6 CM
ECHO AO ASCENDING AORTA INDEX: 1.61 CM/M2
ECHO AO ROOT DIAM: 3.8 CM
ECHO AO ROOT INDEX: 1.7 CM/M2
ECHO AV AREA PEAK VELOCITY: 1.9 CM2
ECHO AV AREA/BSA PEAK VELOCITY: 0.8 CM2/M2
ECHO AV PEAK GRADIENT: 5 MMHG
ECHO AV PEAK VELOCITY: 1.1 M/S
ECHO AV VELOCITY RATIO: 0.64
ECHO BSA: 2.28 M2
ECHO IVC INSP: 0.7 CM
ECHO IVC PROX: 1.5 CM
ECHO LA AREA 2C: 21.5 CM2
ECHO LA AREA 4C: 18.4 CM2
ECHO LA MAJOR AXIS: 5.4 CM
ECHO LA MINOR AXIS: 5.7 CM
ECHO LA VOL BP: 57 ML (ref 22–52)
ECHO LA VOL MOD A2C: 64 ML (ref 22–52)
ECHO LA VOL MOD A4C: 49 ML (ref 22–52)
ECHO LA VOL/BSA BIPLANE: 25 ML/M2 (ref 16–34)
ECHO LA VOLUME INDEX MOD A2C: 29 ML/M2 (ref 16–34)
ECHO LA VOLUME INDEX MOD A4C: 22 ML/M2 (ref 16–34)
ECHO LV E' LATERAL VELOCITY: 12 CM/S
ECHO LV E' SEPTAL VELOCITY: 6.92 CM/S
ECHO LV EDV A2C: 135 ML
ECHO LV EDV A4C: 130 ML
ECHO LV EDV INDEX A4C: 58 ML/M2
ECHO LV EDV NDEX A2C: 60 ML/M2
ECHO LV EF PHYSICIAN: 50 %
ECHO LV EJECTION FRACTION A2C: 52 %
ECHO LV EJECTION FRACTION A4C: 58 %
ECHO LV EJECTION FRACTION BIPLANE: 55 % (ref 55–100)
ECHO LV ESV A2C: 65 ML
ECHO LV ESV A4C: 54 ML
ECHO LV ESV INDEX A2C: 29 ML/M2
ECHO LV ESV INDEX A4C: 24 ML/M2
ECHO LV FRACTIONAL SHORTENING: 30 % (ref 28–44)
ECHO LV INTERNAL DIMENSION DIASTOLE INDEX: 2.5 CM/M2
ECHO LV INTERNAL DIMENSION DIASTOLIC: 5.6 CM (ref 3.9–5.3)
ECHO LV INTERNAL DIMENSION SYSTOLIC INDEX: 1.74 CM/M2
ECHO LV INTERNAL DIMENSION SYSTOLIC: 3.9 CM
ECHO LV IVSD: 0.6 CM (ref 0.6–0.9)
ECHO LV MASS 2D: 127.8 G (ref 67–162)
ECHO LV MASS INDEX 2D: 57.1 G/M2 (ref 43–95)
ECHO LV POSTERIOR WALL DIASTOLIC: 0.7 CM (ref 0.6–0.9)
ECHO LV RELATIVE WALL THICKNESS RATIO: 0.25
ECHO LVOT AREA: 2.8 CM2
ECHO LVOT DIAM: 1.9 CM
ECHO LVOT MEAN GRADIENT: 1 MMHG
ECHO LVOT PEAK GRADIENT: 2 MMHG
ECHO LVOT PEAK VELOCITY: 0.7 M/S
ECHO LVOT STROKE VOLUME INDEX: 20.1 ML/M2
ECHO LVOT SV: 45.1 ML
ECHO LVOT VTI: 15.9 CM
ECHO MV A VELOCITY: 0.52 M/S
ECHO MV E VELOCITY: 0.41 M/S
ECHO MV E/A RATIO: 0.79
ECHO MV E/E' LATERAL: 3.42
ECHO MV E/E' RATIO (AVERAGED): 4.67
ECHO MV E/E' SEPTAL: 5.92
ECHO PV MAX VELOCITY: 0.8 M/S
ECHO PV PEAK GRADIENT: 3 MMHG
ECHO RA AREA 4C: 13.4 CM2
ECHO RA END SYSTOLIC VOLUME APICAL 4 CHAMBER INDEX BSA: 13 ML/M2
ECHO RA VOLUME: 28 ML
ECHO RV BASAL DIMENSION: 3.8 CM
ECHO RV FREE WALL PEAK S': 13.3 CM/S
ECHO RV LONGITUDINAL DIMENSION: 7.8 CM
ECHO RV MID DIMENSION: 1.9 CM
ECHO RV TAPSE: 1.7 CM (ref 1.7–?)

## 2025-02-19 PROCEDURE — 93306 TTE W/DOPPLER COMPLETE: CPT

## 2025-02-24 ENCOUNTER — TELEPHONE (OUTPATIENT)
Dept: CARDIOLOGY CLINIC | Age: 50
End: 2025-02-24

## 2025-02-24 NOTE — TELEPHONE ENCOUNTER
----- Message from Dr. Ra Olivares MD sent at 2/21/2025  5:29 PM EST -----  Please call her and let her know that her LV function is borderline.  Therefore I do not think that is a good idea for her to stop her medication

## 2025-02-24 NOTE — TELEPHONE ENCOUNTER
Patient notified to continue taking current medication given borderline EF. Patient verbalized understanding.

## 2025-03-02 DIAGNOSIS — I42.0 DILATED CARDIOMYOPATHY (HCC): ICD-10-CM

## 2025-03-03 RX ORDER — ENALAPRIL MALEATE 5 MG/1
TABLET ORAL
Qty: 90 TABLET | Refills: 3 | Status: SHIPPED | OUTPATIENT
Start: 2025-03-03

## 2025-03-03 NOTE — TELEPHONE ENCOUNTER
Received refill request for enalapril (VASOTEC) 5 MG tablet  from [facility] pharmacy.     Last OV: 2/4/25    Next OV: 8/5/25    Last Labs:     Last Filled: 2/29/24

## 2025-04-01 RX ORDER — CARVEDILOL 3.12 MG/1
3.12 TABLET ORAL 2 TIMES DAILY WITH MEALS
Qty: 180 TABLET | Refills: 3 | Status: SHIPPED | OUTPATIENT
Start: 2025-04-01

## 2025-06-11 ENCOUNTER — OFFICE VISIT (OUTPATIENT)
Dept: SURGERY | Age: 50
End: 2025-06-11
Payer: COMMERCIAL

## 2025-06-11 VITALS
DIASTOLIC BLOOD PRESSURE: 82 MMHG | OXYGEN SATURATION: 97 % | SYSTOLIC BLOOD PRESSURE: 129 MMHG | RESPIRATION RATE: 16 BRPM | HEART RATE: 82 BPM | WEIGHT: 230.8 LBS | TEMPERATURE: 97.5 F | BODY MASS INDEX: 31.3 KG/M2

## 2025-06-11 DIAGNOSIS — Z09 POSTOP CHECK: Primary | ICD-10-CM

## 2025-06-11 PROCEDURE — 99213 OFFICE O/P EST LOW 20 MIN: CPT | Performed by: SURGERY

## 2025-06-11 NOTE — PROGRESS NOTES
results. Advised that she can have Tylenol/Motrin for discomfort. Discussed signs to watch and she will follow-up in 1 year.     Corey Palacios MD  [facility]  [phone]  06/11/25

## 2025-08-04 ENCOUNTER — TELEPHONE (OUTPATIENT)
Dept: CARDIOLOGY CLINIC | Age: 50
End: 2025-08-04

## 2025-08-05 ENCOUNTER — OFFICE VISIT (OUTPATIENT)
Dept: CARDIOLOGY CLINIC | Age: 50
End: 2025-08-05
Attending: INTERNAL MEDICINE
Payer: COMMERCIAL

## 2025-08-05 ENCOUNTER — HOSPITAL ENCOUNTER (OUTPATIENT)
Age: 50
Discharge: HOME | End: 2025-08-07
Attending: INTERNAL MEDICINE
Payer: COMMERCIAL

## 2025-08-05 VITALS
SYSTOLIC BLOOD PRESSURE: 118 MMHG | HEIGHT: 72 IN | BODY MASS INDEX: 31.02 KG/M2 | WEIGHT: 229 LBS | HEART RATE: 74 BPM | OXYGEN SATURATION: 97 % | DIASTOLIC BLOOD PRESSURE: 66 MMHG

## 2025-08-05 VITALS
BODY MASS INDEX: 31.15 KG/M2 | HEIGHT: 72 IN | SYSTOLIC BLOOD PRESSURE: 134 MMHG | DIASTOLIC BLOOD PRESSURE: 92 MMHG | WEIGHT: 230 LBS

## 2025-08-05 DIAGNOSIS — I51.9 LV DYSFUNCTION: ICD-10-CM

## 2025-08-05 DIAGNOSIS — E66.09 CLASS 1 OBESITY DUE TO EXCESS CALORIES WITH SERIOUS COMORBIDITY AND BODY MASS INDEX (BMI) OF 32.0 TO 32.9 IN ADULT: ICD-10-CM

## 2025-08-05 DIAGNOSIS — I49.3 PVC (PREMATURE VENTRICULAR CONTRACTION): Primary | ICD-10-CM

## 2025-08-05 DIAGNOSIS — I49.3 PVC (PREMATURE VENTRICULAR CONTRACTION): ICD-10-CM

## 2025-08-05 DIAGNOSIS — E66.811 CLASS 1 OBESITY DUE TO EXCESS CALORIES WITH SERIOUS COMORBIDITY AND BODY MASS INDEX (BMI) OF 32.0 TO 32.9 IN ADULT: ICD-10-CM

## 2025-08-05 LAB
ECHO AO ASC DIAM: 3.6 CM
ECHO AO ASCENDING AORTA INDEX: 1.59 CM/M2
ECHO AO ROOT DIAM: 3.3 CM
ECHO AO ROOT INDEX: 1.46 CM/M2
ECHO AV AREA PEAK VELOCITY: 3.5 CM2
ECHO AV AREA VTI: 3.1 CM2
ECHO AV AREA/BSA PEAK VELOCITY: 1.5 CM2/M2
ECHO AV AREA/BSA VTI: 1.4 CM2/M2
ECHO AV MEAN GRADIENT: 3 MMHG
ECHO AV MEAN VELOCITY: 0.8 M/S
ECHO AV PEAK GRADIENT: 6 MMHG
ECHO AV PEAK VELOCITY: 1.2 M/S
ECHO AV VELOCITY RATIO: 0.83
ECHO AV VTI: 27.6 CM
ECHO BSA: 2.3 M2
ECHO LA AREA 2C: 17.1 CM2
ECHO LA AREA 4C: 16.3 CM2
ECHO LA MAJOR AXIS: 5 CM
ECHO LA MINOR AXIS: 4.4 CM
ECHO LA VOL BP: 51 ML (ref 22–52)
ECHO LA VOL MOD A2C: 55 ML (ref 22–52)
ECHO LA VOL MOD A4C: 41 ML (ref 22–52)
ECHO LA VOL/BSA BIPLANE: 23 ML/M2 (ref 16–34)
ECHO LA VOLUME INDEX MOD A2C: 24 ML/M2 (ref 16–34)
ECHO LA VOLUME INDEX MOD A4C: 18 ML/M2 (ref 16–34)
ECHO LV EDV A2C: 118 ML
ECHO LV EDV A4C: 110 ML
ECHO LV EDV INDEX A4C: 49 ML/M2
ECHO LV EDV NDEX A2C: 52 ML/M2
ECHO LV EF PHYSICIAN: 55 %
ECHO LV EJECTION FRACTION A2C: 53 %
ECHO LV EJECTION FRACTION A4C: 53 %
ECHO LV EJECTION FRACTION BIPLANE: 54 % (ref 55–100)
ECHO LV ESV A2C: 55 ML
ECHO LV ESV A4C: 52 ML
ECHO LV ESV INDEX A2C: 24 ML/M2
ECHO LV ESV INDEX A4C: 23 ML/M2
ECHO LV FRACTIONAL SHORTENING: 27 % (ref 28–44)
ECHO LV INTERNAL DIMENSION DIASTOLE INDEX: 2.3 CM/M2
ECHO LV INTERNAL DIMENSION DIASTOLIC: 5.2 CM (ref 3.9–5.3)
ECHO LV INTERNAL DIMENSION SYSTOLIC INDEX: 1.68 CM/M2
ECHO LV INTERNAL DIMENSION SYSTOLIC: 3.8 CM
ECHO LV IVSD: 0.6 CM (ref 0.6–0.9)
ECHO LV MASS 2D: 133.8 G (ref 67–162)
ECHO LV MASS INDEX 2D: 59.2 G/M2 (ref 43–95)
ECHO LV POSTERIOR WALL DIASTOLIC: 0.9 CM (ref 0.6–0.9)
ECHO LV RELATIVE WALL THICKNESS RATIO: 0.35
ECHO LVOT AREA: 4.5 CM2
ECHO LVOT AV VTI INDEX: 0.68
ECHO LVOT DIAM: 2.4 CM
ECHO LVOT MEAN GRADIENT: 2 MMHG
ECHO LVOT PEAK GRADIENT: 4 MMHG
ECHO LVOT PEAK VELOCITY: 1 M/S
ECHO LVOT STROKE VOLUME INDEX: 37.4 ML/M2
ECHO LVOT SV: 84.6 ML
ECHO LVOT VTI: 18.7 CM
ECHO MV A VELOCITY: 0.43 M/S
ECHO MV E DECELERATION TIME (DT): 219 MS
ECHO MV E VELOCITY: 0.56 M/S
ECHO MV E/A RATIO: 1.3
ECHO PV MAX VELOCITY: 0.9 M/S
ECHO PV PEAK GRADIENT: 4 MMHG
ECHO RA AREA 4C: 11.1 CM2
ECHO RA END SYSTOLIC VOLUME APICAL 4 CHAMBER INDEX BSA: 11 ML/M2
ECHO RA VOLUME: 24 ML
ECHO RV BASAL DIMENSION: 3.7 CM
ECHO RV FREE WALL PEAK S': 12.1 CM/S
ECHO RV TAPSE: 2.6 CM (ref 1.7–?)

## 2025-08-05 PROCEDURE — G2211 COMPLEX E/M VISIT ADD ON: HCPCS | Performed by: INTERNAL MEDICINE

## 2025-08-05 PROCEDURE — 93306 TTE W/DOPPLER COMPLETE: CPT | Performed by: INTERNAL MEDICINE

## 2025-08-05 PROCEDURE — 93000 ELECTROCARDIOGRAM COMPLETE: CPT | Performed by: INTERNAL MEDICINE

## 2025-08-05 PROCEDURE — 93306 TTE W/DOPPLER COMPLETE: CPT

## 2025-08-05 PROCEDURE — 99214 OFFICE O/P EST MOD 30 MIN: CPT | Performed by: INTERNAL MEDICINE

## 2025-08-06 ENCOUNTER — TELEPHONE (OUTPATIENT)
Dept: CARDIOLOGY CLINIC | Age: 50
End: 2025-08-06

## 2025-08-26 ENCOUNTER — HOSPITAL ENCOUNTER (OUTPATIENT)
Dept: MRI IMAGING | Age: 50
Discharge: HOME | End: 2025-08-26
Attending: INTERNAL MEDICINE
Payer: COMMERCIAL

## 2025-08-26 DIAGNOSIS — I51.9 LV DYSFUNCTION: ICD-10-CM

## 2025-08-26 DIAGNOSIS — I49.3 PVC (PREMATURE VENTRICULAR CONTRACTION): ICD-10-CM

## 2025-08-26 PROCEDURE — 75561 CARDIAC MRI FOR MORPH W/DYE: CPT | Performed by: INTERNAL MEDICINE

## 2025-08-26 PROCEDURE — A9577 INJ MULTIHANCE: HCPCS | Performed by: INTERNAL MEDICINE

## 2025-08-26 PROCEDURE — 6360000004 HC RX CONTRAST MEDICATION: Performed by: INTERNAL MEDICINE

## 2025-08-26 PROCEDURE — 75561 CARDIAC MRI FOR MORPH W/DYE: CPT

## 2025-08-26 RX ORDER — SODIUM CHLORIDE 9 MG/ML
INJECTION, SOLUTION INTRAVENOUS ONCE
Status: DISCONTINUED | OUTPATIENT
Start: 2025-08-26 | End: 2025-08-27 | Stop reason: HOSPADM

## 2025-08-26 RX ADMIN — GADOBENATE DIMEGLUMINE 30 ML: 529 INJECTION, SOLUTION INTRAVENOUS at 09:01

## (undated) DEVICE — SYRINGE CATH TIP 50ML

## (undated) DEVICE — GAUZE FLUFF 1 PLY: Brand: DEROYAL

## (undated) DEVICE — 1LYRTR 16FR10ML100%SIL UMS SNP: Brand: MEDLINE INDUSTRIES, INC.

## (undated) DEVICE — GLOVE ORANGE PI 7 1/2   MSG9075

## (undated) DEVICE — PROVE COVER: Brand: UNBRANDED

## (undated) DEVICE — SURGICAL SET UP - SURE SET: Brand: MEDLINE INDUSTRIES, INC.

## (undated) DEVICE — BLANKET WRM W40.2XL55.9IN IORT LO BODY + MISTRAL AIR

## (undated) DEVICE — 3M™ IOBAN™ 2 ANTIMICROBIAL INCISE DRAPE 6648EZ: Brand: IOBAN™ 2

## (undated) DEVICE — GLOVE ORANGE PI 8 1/2   MSG9085

## (undated) DEVICE — JEWISH HOSPITAL TURNOVER KIT: Brand: MEDLINE INDUSTRIES, INC.

## (undated) DEVICE — ST FLUFF LG 1 PLY: Brand: DEROYAL

## (undated) DEVICE — PADDING CAST W20XL29.8IN FOAM SELF ADH M SUPP LTWT RESTON

## (undated) DEVICE — GLOVE SURG SZ 75 L12IN FNGR THK79MIL GRN LTX FREE

## (undated) DEVICE — TOWEL,STOP FLAG GOLD N-W: Brand: MEDLINE

## (undated) DEVICE — BINDER ABD UNIV H9IN WAIST 30-45IN E SFT COT PREM 3 PNL

## (undated) DEVICE — 15' LARGE BORE TUBING W/SILICONE INSERT FOR INFILTRATION PUMP SYSTEMS -SINGLE SPIKE,  BOX OF 10: Brand: INFILTRATION TUBING

## (undated) DEVICE — SURE SET-DOUBLE BASIN-LF: Brand: MEDLINE INDUSTRIES, INC.

## (undated) DEVICE — STANDARD HYPODERMIC NEEDLE,POLYPROPYLENE HUB: Brand: MONOJECT

## (undated) DEVICE — Z DISCONTINUED NEEDLE HYPO 22GA L1.5IN BLK POLYPR HUB S STL REG BVL STR - SEE COMMENT

## (undated) DEVICE — PLATE ES AD W 9FT CRD 2

## (undated) DEVICE — PENCIL ES ULT VAC W TELSCP NOSE EZ CLN BLDE 10FT

## (undated) DEVICE — SYRINGE MED 10ML LUERLOCK TIP W/O SFTY DISP

## (undated) DEVICE — SHEET,DRAPE,40X58,STERILE: Brand: MEDLINE

## (undated) DEVICE — ADHESIVE SKIN CLOSURE WND 8.661X1.5 IN 22 CM LIQUIBAND SECUR

## (undated) DEVICE — COVER LT HNDL BLU PLAS

## (undated) DEVICE — STERILE PVP: Brand: MEDLINE INDUSTRIES, INC.

## (undated) DEVICE — INTENDED USE FOR SURGICAL MARKING ON INTACT SKIN, ALSO PROVIDES A PERMANENT METHOD OF IDENTIFYING OBJECTS IN THE OPERATING ROOM: Brand: WRITESITE® PLUS MINI PREP RESISTANT MARKER

## (undated) DEVICE — 3M™ TEGADERM™ CHG CHLORHEXIDINE GLUCONATE GEL PAD 1664, 25 EACH/CARTON, 4 CARTONS/CASE: Brand: 3M™ TEGADERM™

## (undated) DEVICE — TUBING, SUCTION, 9/32" X 12', STRAIGHT: Brand: MEDLINE INDUSTRIES, INC.

## (undated) DEVICE — DRAPE,CHEST,FENES,15X10,STERIL: Brand: MEDLINE

## (undated) DEVICE — ASPIRATION TUBING SET, DISPOSABLE: Brand: MICROAIRE®

## (undated) DEVICE — 4MM SINGLE USE CANNULA, 10MM PORT, 22CM LONG, MERCEDES: Brand: MICROAIRE®

## (undated) DEVICE — Z DISCONTINUED USE 2272114 DRAPE SURG UTIL 26X15 IN W/ TAPE N INVASIVE MULTLYR DISP

## (undated) DEVICE — 450 ML BOTTLE OF 0.05% CHLORHEXIDINE GLUCONATE IN 99.95% STERILE WATER FOR IRRIGATION, USP AND APPLICATOR.: Brand: IRRISEPT ANTIMICROBIAL WOUND LAVAGE

## (undated) DEVICE — DRAPE,T,LAPARO,TRANS,STERILE: Brand: MEDLINE

## (undated) DEVICE — GLOVE SURG SZ 6 L112IN FNGR THK75MIL STRW LTX POLYMER BEAD

## (undated) DEVICE — GLOVE SURG SZ 65 THK91MIL LTX FREE SYN POLYISOPRENE

## (undated) DEVICE — PAD FOAM 11.75X7-7/8 IN RESTON LF

## (undated) DEVICE — Device

## (undated) DEVICE — SUTURE PLN GUT SZ 5-0 L18IN ABSRB YELLOWISH TAN L13MM PC-1 1915G

## (undated) DEVICE — DRAIN SURG 15FR L3 16IN DIA47MM SIL RND HUBLESS FULL FLUT

## (undated) DEVICE — BRA SURG X LG 42-44 IN VELCRO FRONT CLOSURE LACE TRIM

## (undated) DEVICE — E-Z CLEAN, NON-STICK, PTFE COATED, ELECTROSURGICAL BLADE ELECTRODE, MODIFIED EXTENDED INSULATION, 2.5 INCH (6.35 CM): Brand: MEGADYNE

## (undated) DEVICE — BLADE,CARBON-STEEL,15,STRL,DISPOSABLE,TB: Brand: MEDLINE

## (undated) DEVICE — 4MM SINGLE USE CANNULA, 10MM PORT, 22CM LONG, TRI-PORT II: Brand: MICROAIRE®

## (undated) DEVICE — PACK SURGICAL PROC CUSTOM TISSUE PERFUSION SYSTEM SPY ELITE

## (undated) DEVICE — PROBE LOCALIZER W/DRAPE

## (undated) DEVICE — SYRINGE IRRIG 60ML SFT PLIABLE BLB EZ TO GRP 1 HND USE W/

## (undated) DEVICE — SUTURE MCRYL SZ 3-0 L27IN ABSRB UD L19MM PS-2 3/8 CIR PRIM Y427H

## (undated) DEVICE — CLEANER,CAUTERY TIP,2X2",STERILE: Brand: MEDLINE

## (undated) DEVICE — SUTURE MCRYL SZ 4-0 L27IN ABSRB UD L19MM PS-2 1/2 CIR PRIM Y426H

## (undated) DEVICE — SUTURE PDS II SZ 2-0 L27IN ABSRB UD FS-1 L24MM 3/8 CIR REV Z443H

## (undated) DEVICE — COVER,MAYO STAND,XL,STERILE: Brand: MEDLINE

## (undated) DEVICE — GLOVE SURG SZ 65 L12IN FNGR THK79MIL GRN LTX FREE

## (undated) DEVICE — SUTURE VCRL SZ 3-0 L18IN ABSRB UD L26MM SH 1/2 CIR J864D

## (undated) DEVICE — SUTURE VCRL SZ 3-0 L27IN ABSRB UD L26MM SH 1/2 CIR J416H

## (undated) DEVICE — SYSTEM FAT PROC ADV ADIPOSE REVOLVE 1 PER CA

## (undated) DEVICE — DEVICE SPEC PERF COMPR PLT FOR SPEC RADIOGRAPHY TRANSPEC

## (undated) DEVICE — SYSTEM IMPL DEL FOR BRST IMPL FUN (SEE COMMENT)

## (undated) DEVICE — PLASTIC MAJOR: Brand: MEDLINE INDUSTRIES, INC.

## (undated) DEVICE — PLASMABLADE PS210-030S 3.0S LOCK: Brand: PLASMABLADE™

## (undated) DEVICE — SPONGE,LAP,18"X18",DLX,XR,ST,5/PK,40/PK: Brand: MEDLINE

## (undated) DEVICE — SUTURE PERMA-HAND SZ 2-0 L30IN NONABSORBABLE BLK L26MM SH K833H

## (undated) DEVICE — GARMENT,MEDLINE,DVT,INT,CALF,MED, GEN2: Brand: MEDLINE

## (undated) DEVICE — SYSTEM SKIN CLSR 22CM DERMBND PRINEO

## (undated) DEVICE — APPLICATOR MEDICATED 26 CC SOLUTION HI LT ORNG CHLORAPREP

## (undated) DEVICE — SYSTEM LIPO FAT PROC REVOLVE RV001] ALLERGAN USA INC]

## (undated) DEVICE — INTENDED FOR TISSUE SEPARATION, AND OTHER PROCEDURES THAT REQUIRE A SHARP SURGICAL BLADE TO PUNCTURE OR CUT.: Brand: BARD-PARKER ® CARBON RIB-BACK BLADES

## (undated) DEVICE — SUTURE PDS II SZ 2-0 L27IN ABSRB VLT L26MM CT-2 1/2 CIR Z333H

## (undated) DEVICE — ADHESIVE SKIN CLSR 0.7ML TOP DERMBND ADV

## (undated) DEVICE — BLADE,CARBON-STEEL,10,STRL,DISPOSABLE,TB: Brand: MEDLINE